# Patient Record
Sex: MALE | Race: WHITE | NOT HISPANIC OR LATINO | Employment: UNEMPLOYED | URBAN - METROPOLITAN AREA
[De-identification: names, ages, dates, MRNs, and addresses within clinical notes are randomized per-mention and may not be internally consistent; named-entity substitution may affect disease eponyms.]

---

## 2022-10-11 ENCOUNTER — HOSPITAL ENCOUNTER (INPATIENT)
Facility: HOSPITAL | Age: 62
LOS: 9 days | Discharge: NURSING, REHAB OR LONG-TERM CARE | End: 2022-10-20
Attending: EMERGENCY MEDICINE | Admitting: INTERNAL MEDICINE

## 2022-10-11 ENCOUNTER — APPOINTMENT (OUTPATIENT)
Dept: GENERAL RADIOLOGY | Facility: HOSPITAL | Age: 62
End: 2022-10-11

## 2022-10-11 ENCOUNTER — APPOINTMENT (OUTPATIENT)
Dept: CT IMAGING | Facility: HOSPITAL | Age: 62
End: 2022-10-11

## 2022-10-11 DIAGNOSIS — A41.9 SEPSIS, DUE TO UNSPECIFIED ORGANISM, UNSPECIFIED WHETHER ACUTE ORGAN DYSFUNCTION PRESENT: Primary | ICD-10-CM

## 2022-10-11 DIAGNOSIS — Z89.611 S/P AKA (ABOVE KNEE AMPUTATION), RIGHT: ICD-10-CM

## 2022-10-11 DIAGNOSIS — Z78.9 DECREASED ACTIVITIES OF DAILY LIVING (ADL): ICD-10-CM

## 2022-10-11 DIAGNOSIS — I70.261 ATHEROSCLEROSIS OF NATIVE ARTERY OF RIGHT LOWER EXTREMITY WITH GANGRENE: ICD-10-CM

## 2022-10-11 DIAGNOSIS — R26.2 DIFFICULTY WALKING: ICD-10-CM

## 2022-10-11 LAB
ALBUMIN SERPL-MCNC: 3.2 G/DL (ref 3.5–5.2)
ALBUMIN/GLOB SERPL: 0.6 G/DL
ALP SERPL-CCNC: 348 U/L (ref 39–117)
ALT SERPL W P-5'-P-CCNC: 67 U/L (ref 1–41)
ANION GAP SERPL CALCULATED.3IONS-SCNC: 10.9 MMOL/L (ref 5–15)
ANION GAP SERPL CALCULATED.3IONS-SCNC: 14.4 MMOL/L (ref 5–15)
AST SERPL-CCNC: 42 U/L (ref 1–40)
BASOPHILS # BLD AUTO: 0.05 10*3/MM3 (ref 0–0.2)
BASOPHILS NFR BLD AUTO: 0.2 % (ref 0–1.5)
BILIRUB SERPL-MCNC: 0.4 MG/DL (ref 0–1.2)
BUN SERPL-MCNC: 15 MG/DL (ref 8–23)
BUN SERPL-MCNC: 18 MG/DL (ref 8–23)
BUN/CREAT SERPL: 30.6 (ref 7–25)
BUN/CREAT SERPL: 32.7 (ref 7–25)
CALCIUM SPEC-SCNC: 10.3 MG/DL (ref 8.6–10.5)
CALCIUM SPEC-SCNC: 9.1 MG/DL (ref 8.6–10.5)
CHLORIDE SERPL-SCNC: 83 MMOL/L (ref 98–107)
CHLORIDE SERPL-SCNC: 87 MMOL/L (ref 98–107)
CK SERPL-CCNC: 185 U/L (ref 20–200)
CO2 SERPL-SCNC: 21.6 MMOL/L (ref 22–29)
CO2 SERPL-SCNC: 23.1 MMOL/L (ref 22–29)
CREAT SERPL-MCNC: 0.49 MG/DL (ref 0.76–1.27)
CREAT SERPL-MCNC: 0.55 MG/DL (ref 0.76–1.27)
D-LACTATE SERPL-SCNC: 1.9 MMOL/L (ref 0.5–2)
DEPRECATED RDW RBC AUTO: 50.7 FL (ref 37–54)
EGFRCR SERPLBLD CKD-EPI 2021: 111.4 ML/MIN/1.73
EGFRCR SERPLBLD CKD-EPI 2021: 115.3 ML/MIN/1.73
EOSINOPHIL # BLD AUTO: 0.03 10*3/MM3 (ref 0–0.4)
EOSINOPHIL NFR BLD AUTO: 0.1 % (ref 0.3–6.2)
ERYTHROCYTE [DISTWIDTH] IN BLOOD BY AUTOMATED COUNT: 15.9 % (ref 12.3–15.4)
GLOBULIN UR ELPH-MCNC: 5.2 GM/DL
GLUCOSE SERPL-MCNC: 104 MG/DL (ref 65–99)
GLUCOSE SERPL-MCNC: 127 MG/DL (ref 65–99)
HCT VFR BLD AUTO: 32.5 % (ref 37.5–51)
HGB BLD-MCNC: 11.1 G/DL (ref 13–17.7)
HOLD SPECIMEN: NORMAL
HOLD SPECIMEN: NORMAL
IMM GRANULOCYTES # BLD AUTO: 0.38 10*3/MM3 (ref 0–0.05)
IMM GRANULOCYTES NFR BLD AUTO: 1.9 % (ref 0–0.5)
INR PPP: 1.07 (ref 0.86–1.15)
LARGE PLATELETS: NORMAL
LYMPHOCYTES # BLD AUTO: 1.86 10*3/MM3 (ref 0.7–3.1)
LYMPHOCYTES NFR BLD AUTO: 9.2 % (ref 19.6–45.3)
MAGNESIUM SERPL-MCNC: 1.9 MG/DL (ref 1.6–2.4)
MCH RBC QN AUTO: 30.3 PG (ref 26.6–33)
MCHC RBC AUTO-ENTMCNC: 34.2 G/DL (ref 31.5–35.7)
MCV RBC AUTO: 88.8 FL (ref 79–97)
MONOCYTES # BLD AUTO: 1.95 10*3/MM3 (ref 0.1–0.9)
MONOCYTES NFR BLD AUTO: 9.6 % (ref 5–12)
NEUTROPHILS NFR BLD AUTO: 16.05 10*3/MM3 (ref 1.7–7)
NEUTROPHILS NFR BLD AUTO: 79 % (ref 42.7–76)
NRBC BLD AUTO-RTO: 0 /100 WBC (ref 0–0.2)
NT-PROBNP SERPL-MCNC: 115.3 PG/ML (ref 0–900)
PHOSPHATE SERPL-MCNC: 3 MG/DL (ref 2.5–4.5)
PLATELET # BLD AUTO: 792 10*3/MM3 (ref 140–450)
PMV BLD AUTO: 8 FL (ref 6–12)
POTASSIUM SERPL-SCNC: 4.2 MMOL/L (ref 3.5–5.2)
POTASSIUM SERPL-SCNC: 5.3 MMOL/L (ref 3.5–5.2)
PROT SERPL-MCNC: 8.4 G/DL (ref 6–8.5)
PROTHROMBIN TIME: 14 SECONDS (ref 11.8–14.9)
RBC # BLD AUTO: 3.66 10*6/MM3 (ref 4.14–5.8)
RBC MORPH BLD: NORMAL
SMALL PLATELETS BLD QL SMEAR: NORMAL
SODIUM SERPL-SCNC: 119 MMOL/L (ref 136–145)
SODIUM SERPL-SCNC: 121 MMOL/L (ref 136–145)
TROPONIN T SERPL-MCNC: <0.01 NG/ML (ref 0–0.03)
WBC MORPH BLD: NORMAL
WBC NRBC COR # BLD: 20.32 10*3/MM3 (ref 3.4–10.8)
WHOLE BLOOD HOLD COAG: NORMAL
WHOLE BLOOD HOLD SPECIMEN: NORMAL

## 2022-10-11 PROCEDURE — 83880 ASSAY OF NATRIURETIC PEPTIDE: CPT | Performed by: EMERGENCY MEDICINE

## 2022-10-11 PROCEDURE — 75635 CT ANGIO ABDOMINAL ARTERIES: CPT

## 2022-10-11 PROCEDURE — 93010 ELECTROCARDIOGRAM REPORT: CPT | Performed by: SPECIALIST

## 2022-10-11 PROCEDURE — 25010000002 VANCOMYCIN 5 G RECONSTITUTED SOLUTION: Performed by: EMERGENCY MEDICINE

## 2022-10-11 PROCEDURE — G0432 EIA HIV-1/HIV-2 SCREEN: HCPCS | Performed by: FAMILY MEDICINE

## 2022-10-11 PROCEDURE — 87040 BLOOD CULTURE FOR BACTERIA: CPT | Performed by: EMERGENCY MEDICINE

## 2022-10-11 PROCEDURE — 80048 BASIC METABOLIC PNL TOTAL CA: CPT | Performed by: FAMILY MEDICINE

## 2022-10-11 PROCEDURE — 93005 ELECTROCARDIOGRAM TRACING: CPT | Performed by: EMERGENCY MEDICINE

## 2022-10-11 PROCEDURE — 87070 CULTURE OTHR SPECIMN AEROBIC: CPT | Performed by: EMERGENCY MEDICINE

## 2022-10-11 PROCEDURE — 99223 1ST HOSP IP/OBS HIGH 75: CPT | Performed by: FAMILY MEDICINE

## 2022-10-11 PROCEDURE — 83605 ASSAY OF LACTIC ACID: CPT | Performed by: EMERGENCY MEDICINE

## 2022-10-11 PROCEDURE — 25010000002 PIPERACILLIN SOD-TAZOBACTAM PER 1 G: Performed by: EMERGENCY MEDICINE

## 2022-10-11 PROCEDURE — 85007 BL SMEAR W/DIFF WBC COUNT: CPT | Performed by: EMERGENCY MEDICINE

## 2022-10-11 PROCEDURE — 99285 EMERGENCY DEPT VISIT HI MDM: CPT

## 2022-10-11 PROCEDURE — 84484 ASSAY OF TROPONIN QUANT: CPT | Performed by: EMERGENCY MEDICINE

## 2022-10-11 PROCEDURE — 85610 PROTHROMBIN TIME: CPT | Performed by: FAMILY MEDICINE

## 2022-10-11 PROCEDURE — 94799 UNLISTED PULMONARY SVC/PX: CPT

## 2022-10-11 PROCEDURE — 84100 ASSAY OF PHOSPHORUS: CPT | Performed by: FAMILY MEDICINE

## 2022-10-11 PROCEDURE — 80053 COMPREHEN METABOLIC PANEL: CPT | Performed by: EMERGENCY MEDICINE

## 2022-10-11 PROCEDURE — 0 IOPAMIDOL PER 1 ML: Performed by: EMERGENCY MEDICINE

## 2022-10-11 PROCEDURE — 25010000002 MORPHINE PER 10 MG: Performed by: FAMILY MEDICINE

## 2022-10-11 PROCEDURE — 83735 ASSAY OF MAGNESIUM: CPT | Performed by: EMERGENCY MEDICINE

## 2022-10-11 PROCEDURE — 71045 X-RAY EXAM CHEST 1 VIEW: CPT

## 2022-10-11 PROCEDURE — 83036 HEMOGLOBIN GLYCOSYLATED A1C: CPT | Performed by: FAMILY MEDICINE

## 2022-10-11 PROCEDURE — 87205 SMEAR GRAM STAIN: CPT | Performed by: EMERGENCY MEDICINE

## 2022-10-11 PROCEDURE — 87641 MR-STAPH DNA AMP PROBE: CPT | Performed by: FAMILY MEDICINE

## 2022-10-11 PROCEDURE — 82550 ASSAY OF CK (CPK): CPT | Performed by: FAMILY MEDICINE

## 2022-10-11 PROCEDURE — 85025 COMPLETE CBC W/AUTO DIFF WBC: CPT | Performed by: EMERGENCY MEDICINE

## 2022-10-11 RX ORDER — AMOXICILLIN 250 MG
2 CAPSULE ORAL 2 TIMES DAILY
Status: DISCONTINUED | OUTPATIENT
Start: 2022-10-11 | End: 2022-10-20 | Stop reason: HOSPADM

## 2022-10-11 RX ORDER — CHOLECALCIFEROL (VITAMIN D3) 125 MCG
5 CAPSULE ORAL NIGHTLY PRN
Status: DISCONTINUED | OUTPATIENT
Start: 2022-10-11 | End: 2022-10-20 | Stop reason: HOSPADM

## 2022-10-11 RX ORDER — SODIUM CHLORIDE 0.9 % (FLUSH) 0.9 %
10 SYRINGE (ML) INJECTION AS NEEDED
Status: DISCONTINUED | OUTPATIENT
Start: 2022-10-11 | End: 2022-10-20 | Stop reason: HOSPADM

## 2022-10-11 RX ORDER — POLYETHYLENE GLYCOL 3350 17 G/17G
17 POWDER, FOR SOLUTION ORAL DAILY PRN
Status: DISCONTINUED | OUTPATIENT
Start: 2022-10-11 | End: 2022-10-20 | Stop reason: HOSPADM

## 2022-10-11 RX ORDER — ACETAMINOPHEN 650 MG/1
650 SUPPOSITORY RECTAL EVERY 4 HOURS PRN
Status: DISCONTINUED | OUTPATIENT
Start: 2022-10-11 | End: 2022-10-12

## 2022-10-11 RX ORDER — BISACODYL 10 MG
10 SUPPOSITORY, RECTAL RECTAL DAILY PRN
Status: DISCONTINUED | OUTPATIENT
Start: 2022-10-11 | End: 2022-10-20 | Stop reason: HOSPADM

## 2022-10-11 RX ORDER — ONDANSETRON 2 MG/ML
4 INJECTION INTRAMUSCULAR; INTRAVENOUS EVERY 6 HOURS PRN
Status: DISCONTINUED | OUTPATIENT
Start: 2022-10-11 | End: 2022-10-20 | Stop reason: HOSPADM

## 2022-10-11 RX ORDER — BISACODYL 5 MG/1
5 TABLET, DELAYED RELEASE ORAL DAILY PRN
Status: DISCONTINUED | OUTPATIENT
Start: 2022-10-11 | End: 2022-10-20 | Stop reason: HOSPADM

## 2022-10-11 RX ORDER — ACETAMINOPHEN 160 MG/5ML
650 SOLUTION ORAL EVERY 4 HOURS PRN
Status: DISCONTINUED | OUTPATIENT
Start: 2022-10-11 | End: 2022-10-12

## 2022-10-11 RX ORDER — SODIUM CHLORIDE 0.9 % (FLUSH) 0.9 %
10 SYRINGE (ML) INJECTION EVERY 12 HOURS SCHEDULED
Status: DISCONTINUED | OUTPATIENT
Start: 2022-10-11 | End: 2022-10-20 | Stop reason: HOSPADM

## 2022-10-11 RX ORDER — MORPHINE SULFATE 2 MG/ML
1 INJECTION, SOLUTION INTRAMUSCULAR; INTRAVENOUS EVERY 4 HOURS PRN
Status: DISCONTINUED | OUTPATIENT
Start: 2022-10-11 | End: 2022-10-14

## 2022-10-11 RX ORDER — SODIUM CHLORIDE 9 MG/ML
40 INJECTION, SOLUTION INTRAVENOUS AS NEEDED
Status: DISCONTINUED | OUTPATIENT
Start: 2022-10-11 | End: 2022-10-20 | Stop reason: HOSPADM

## 2022-10-11 RX ORDER — NALOXONE HCL 0.4 MG/ML
0.4 VIAL (ML) INJECTION
Status: DISCONTINUED | OUTPATIENT
Start: 2022-10-11 | End: 2022-10-14

## 2022-10-11 RX ORDER — ACETAMINOPHEN 325 MG/1
650 TABLET ORAL EVERY 4 HOURS PRN
Status: DISCONTINUED | OUTPATIENT
Start: 2022-10-11 | End: 2022-10-20 | Stop reason: HOSPADM

## 2022-10-11 RX ORDER — SODIUM CHLORIDE, SODIUM LACTATE, POTASSIUM CHLORIDE, CALCIUM CHLORIDE 600; 310; 30; 20 MG/100ML; MG/100ML; MG/100ML; MG/100ML
100 INJECTION, SOLUTION INTRAVENOUS CONTINUOUS
Status: DISCONTINUED | OUTPATIENT
Start: 2022-10-11 | End: 2022-10-13

## 2022-10-11 RX ADMIN — IOPAMIDOL 100 ML: 755 INJECTION, SOLUTION INTRAVENOUS at 18:56

## 2022-10-11 RX ADMIN — TAZOBACTAM SODIUM AND PIPERACILLIN SODIUM 3.38 G: 375; 3 INJECTION, SOLUTION INTRAVENOUS at 17:51

## 2022-10-11 RX ADMIN — Medication 1000 MG: at 18:23

## 2022-10-11 RX ADMIN — ACETAMINOPHEN 650 MG: 325 TABLET ORAL at 23:26

## 2022-10-11 RX ADMIN — Medication 10 ML: at 23:17

## 2022-10-11 RX ADMIN — SODIUM CHLORIDE, POTASSIUM CHLORIDE, SODIUM LACTATE AND CALCIUM CHLORIDE 100 ML/HR: 600; 310; 30; 20 INJECTION, SOLUTION INTRAVENOUS at 22:05

## 2022-10-11 RX ADMIN — SODIUM CHLORIDE 1000 ML: 9 INJECTION, SOLUTION INTRAVENOUS at 17:48

## 2022-10-11 RX ADMIN — MORPHINE SULFATE 1 MG: 2 INJECTION, SOLUTION INTRAMUSCULAR; INTRAVENOUS at 22:01

## 2022-10-11 NOTE — H&P
[facility]  Date: 10/11/2022   Patient Name: Uche Pereyra  : 1960  MRN: [id number]  Primary Care Physician:  Provider, No Known  Date of admission: 10/11/2022    Subjective   Subjective     Chief Complaint: leg pain    HPI:    Uche Pereyra is a 62 y.o. male who presents to the hospital with right leg pain, redness and swelling.  This has reportedly been going on since August.  He states he thought he injured the leg but really cannot give any specifics on that.  He presented to the emergency department because the leg was not getting better and was causing him significant pain.  Pain is severe, located in the right lower extremity, sharp in nature, constant and unrelieved with initial doses of morphine.  In the emergency department he was noted to have extensive gangrene of the right lower extremity with maggots.  He was also noted to be septic and was started on initial treatment for sepsis with IV antibiotics and IV fluids.  Vascular surgery was consulted and requested a CT with runoff for surgical planning purposes.  We are admitting him to the ICU for further inpatient management      Personal History     Past Medical History:  History reviewed. Denies past medical history.      Past Surgical History:  History reviewed. Denies surgical history.      Family History:   IUP      Social History:   Social History     Tobacco Use   • Smoking status: Every Day     Packs/day: 1.00     Years: 50.00     Pack years: 50.00     Types: Cigarettes     Passive exposure: Current   • Smokeless tobacco: Never   Vaping Use   • Vaping Use: Never used   Substance Use Topics   • Drug use: Not Currently     Types: Marijuana         Home Medications:       Allergies:  No Known Allergies    Review of Systems  He admits more rapid weight loss recently because he has not felt like eating   All systems were reviewed and negative except for: noted above or in HPI    Objective    Objective     Vitals:   Temp:  [98.6 °F (37 °C)] 98.6 °F (37 °C)  Heart Rate:  [125-141] 125  Resp:  [18] 18  BP: (115-123)/(85-90) 123/90    Physical Exam    Constitutional: Awake, alert, no acute distress, cachectic   Eyes: Pupils equal, sclerae anicteric, no conjunctival injection   HENT: NCAT, mucous membranes moist   Neck: Supple, no thyromegaly, no lymphadenopathy, trachea midline   Respiratory: Clear to auscultation bilaterally, nonlabored respirations    Cardiovascular: RRR, no murmurs, rubs, or gallops, palpable pedal pulses bilaterally   Gastrointestinal: Positive bowel sounds, soft, nontender, nondistended   Musculoskeletal:Extensive foul-smelling gangrene of the right lower extremity with maggots   Psychiatric: Appropriate affect, cooperative   Neurologic: Oriented x 3, strength symmetric in all extremities, Cranial Nerves grossly intact to confrontation, speech clear   Skin: Extensive foul-smelling gangrene of the right lower extremity with maggots           Assessment & Plan   Assessment / Plan     Assessment/Plan:   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 after midnight  Check HIV and hepatitis C status  Obtain ultrasound of the right upper quadrant to evaluate abnormal of the liver enzymes  CBC in am for surveillance of any acute blood loss or thrombocytopenia  Metabolic panel in the morning to evaluate electrolytes and renal function  Reviewed today's labs: Leukocytosis  Discussed with ER physician  Risk of primary complaint: patient is at significant risk of morbidity if primary complaint is not treated especially  considering the patient's comorbidities.  DVT prophylaxis:  Mechanical DVT prophylaxis orders are present.    CODE STATUS:    Code Status (Patient has no pulse and is not breathing): CPR (Attempt to Resuscitate)  Medical Interventions (Patient has pulse or is breathing): Full Support      Admission Status:  I believe this patient meets inpatient status.    Electronically signed by Raul Cook DO, 10/11/22, 7:38 PM EDT.

## 2022-10-11 NOTE — ED PROVIDER NOTES
Time: 5:12 PM EDT  Chief Complaint:   Chief Complaint   Patient presents with   • Wound Infection       History of Present Illness:  Patient is a 62 y.o. year old male who presents to the emergency department with leg infection to R lower extremity. Pt is accompanied by sister. According to sister, she first noticed changes to Pts RLE a couple of weeks ago, states it appeared 'puffy' but thought he had sustained an injury or trauma. Sister is unsure how long has it been since onset of symptoms. Pt doesn't have a PCP assigned yet.      Wound Infection  Location:  RLE  Severity:  Severe  Onset quality:  Unable to specify  Duration:  2 weeks  Timing:  Constant  Progression:  Worsening  Chronicity:  New  Relieved by:  None tried  Worsened by:  Nothing  Ineffective treatments:  None tried  Associated symptoms: no abdominal pain, no chest pain, no congestion, no cough, no diarrhea, no fever, no headaches, no myalgias, no nausea, no rhinorrhea, no shortness of breath, no sore throat and no vomiting            Patient Care Team  Primary Care Provider: Provider, No Known    Past Medical History:     No Known Allergies  History reviewed. No pertinent past medical history.  History reviewed. No pertinent surgical history.  History reviewed. No pertinent family history.    Home Medications:  Prior to Admission medications    Not on File        Social History:   Social History     Tobacco Use   • Smoking status: Every Day     Packs/day: 1.00     Years: 50.00     Pack years: 50.00     Types: Cigarettes     Passive exposure: Current   • Smokeless tobacco: Never   Vaping Use   • Vaping Use: Never used   Substance Use Topics   • Drug use: Not Currently     Types: Marijuana         Review of Systems:  Review of Systems   Constitutional: Negative for chills and fever.   HENT: Negative for congestion, rhinorrhea and sore throat.    Eyes: Negative for pain and visual disturbance.   Respiratory: Negative for apnea, cough, chest tightness  "and shortness of breath.    Cardiovascular: Negative for chest pain and palpitations.   Gastrointestinal: Negative for abdominal pain, diarrhea, nausea and vomiting.   Genitourinary: Negative for difficulty urinating and dysuria.   Musculoskeletal: Negative for joint swelling and myalgias.   Skin: Positive for wound (RLE wound infection). Negative for color change.   Neurological: Negative for seizures and headaches.   Psychiatric/Behavioral: Negative.    All other systems reviewed and are negative.       Physical Exam:  /75 (BP Location: Right arm, Patient Position: Lying)   Pulse (!) 126   Temp 98.6 °F (37 °C) (Oral)   Resp 18   Ht 177.8 cm (70\")   Wt 46.4 kg (102 lb 4.7 oz)   SpO2 100%   BMI 14.68 kg/m²     Physical Exam  Vitals and nursing note reviewed.   Constitutional:       General: He is not in acute distress.     Appearance: Normal appearance. He is not toxic-appearing.   HENT:      Head: Normocephalic and atraumatic.      Jaw: There is normal jaw occlusion.   Eyes:      General: Lids are normal.      Extraocular Movements: Extraocular movements intact.      Conjunctiva/sclera: Conjunctivae normal.      Pupils: Pupils are equal, round, and reactive to light.   Cardiovascular:      Rate and Rhythm: Normal rate and regular rhythm.      Pulses:           Dorsalis pedis pulses are 0 on the right side.      Heart sounds: Normal heart sounds.   Pulmonary:      Effort: Pulmonary effort is normal. No respiratory distress.      Breath sounds: Normal breath sounds. No wheezing or rhonchi.   Abdominal:      General: Abdomen is flat.      Palpations: Abdomen is soft.      Tenderness: There is no abdominal tenderness. There is no guarding or rebound.   Musculoskeletal:         General: Normal range of motion.      Cervical back: Normal range of motion and neck supple.      Right lower leg: No edema.      Left lower leg: No edema.      Right foot: Abnormal pulse (No DP pulse).      Comments: RLE: Gangrene " with foul smell and maggots.   Skin:     General: Skin is warm and dry.   Neurological:      Mental Status: He is alert and oriented to person, place, and time. Mental status is at baseline.   Psychiatric:         Mood and Affect: Mood normal.                Medications in the Emergency Department:  Medications   sodium chloride 0.9 % flush 10 mL (has no administration in time range)   sodium chloride 0.9 % flush 10 mL (has no administration in time range)   sodium chloride 0.9 % flush 10 mL (10 mL Intravenous Given 10/11/22 2317)   sodium chloride 0.9 % infusion 40 mL (has no administration in time range)   acetaminophen (TYLENOL) tablet 650 mg (650 mg Oral Given 10/11/22 2326)     Or   acetaminophen (TYLENOL) 160 MG/5ML solution 650 mg ( Oral Not Given:  See Alt 10/11/22 2326)     Or   acetaminophen (TYLENOL) suppository 650 mg ( Rectal Not Given:  See Alt 10/11/22 2326)   sennosides-docusate (PERICOLACE) 8.6-50 MG per tablet 2 tablet (2 tablets Oral Not Given 10/11/22 2224)     And   polyethylene glycol (MIRALAX) packet 17 g (has no administration in time range)     And   bisacodyl (DULCOLAX) EC tablet 5 mg (has no administration in time range)     And   bisacodyl (DULCOLAX) suppository 10 mg (has no administration in time range)   ondansetron (ZOFRAN) injection 4 mg (has no administration in time range)   lactated ringers bolus 1,392 mL (has no administration in time range)   lactated ringers infusion (100 mL/hr Intravenous New Bag 10/11/22 2205)   Pharmacy to dose vancomycin (has no administration in time range)   piperacillin-tazobactam (ZOSYN) 3.375 g in iso-osmotic dextrose 50 ml (premix) (has no administration in time range)   melatonin tablet 5 mg (has no administration in time range)   morphine injection 1 mg (1 mg Intravenous Given 10/11/22 2201)     And   naloxone (NARCAN) injection 0.4 mg (has no administration in time range)   vancomycin 1000 mg/250 mL 0.9% NS IVPB ([facility]) (has no administration in time  range)   sodium chloride 0.9 % bolus 1,000 mL (0 mL Intravenous Stopped 10/11/22 2018)   piperacillin-tazobactam (ZOSYN) 3.375 g in iso-osmotic dextrose 50 ml (premix) (0 g Intravenous Stopped 10/11/22 1821)   vancomycin 1000 mg/250 mL 0.9% NS IVPB ([facility]) (0 mg Intravenous Stopped 10/11/22 2018)   iopamidol (ISOVUE-370) 76 % injection 100 mL (100 mL Intravenous Given 10/11/22 1856)        Labs  Lab Results (last 24 hours)     Procedure Component Value Units Date/Time    Wound Culture - Wound, Leg, Right [[id number]] Collected: 10/11/22 1720    Specimen: Wound from Leg, Right Updated: 10/11/22 1749     Gram Stain Few (2+) Gram positive cocci in pairs and clusters      Few (2+) Gram negative bacilli    CBC & Differential [[id number]]  (Abnormal) Collected: 10/11/22 1744    Specimen: Blood Updated: 10/11/22 1818    Narrative:      The following orders were created for panel order CBC & Differential.  Procedure                               Abnormality         Status                     ---------                               -----------         ------                     CBC Auto Differential[[id number]]        Abnormal            Final result               Scan Slide[[id number]]                                       Final result                 Please view results for these tests on the individual orders.    Comprehensive Metabolic Panel [[id number]]  (Abnormal) Collected: 10/11/22 1744    Specimen: Blood Updated: 10/11/22 1813     Glucose 127 mg/dL      BUN 18 mg/dL      Creatinine 0.55 mg/dL      Sodium 119 mmol/L      Potassium 5.3 mmol/L      Chloride 83 mmol/L      CO2 21.6 mmol/L      Calcium 10.3 mg/dL      Total Protein 8.4 g/dL      Albumin 3.20 g/dL      ALT (SGPT) 67 U/L      AST (SGOT) 42 U/L      Alkaline Phosphatase 348 U/L      Total Bilirubin 0.4 mg/dL      Globulin 5.2 gm/dL      A/G Ratio 0.6 g/dL      BUN/Creatinine Ratio 32.7     Anion Gap 14.4 mmol/L      eGFR 111.4 mL/min/1.73      Comment: National  Kidney Foundation and American Society of Nephrology (ASN) Task Force recommended calculation based on the Chronic Kidney Disease Epidemiology Collaboration (CKD-EPI) equation refit without adjustment for race.       Narrative:      GFR Normal >60  Chronic Kidney Disease <60  Kidney Failure <15      Troponin [[id number]]  (Normal) Collected: 10/11/22 1744    Specimen: Blood Updated: 10/11/22 1808     Troponin T <0.010 ng/mL     Narrative:      Troponin T Reference Range:  <= 0.03 ng/mL-   Negative for AMI  >0.03 ng/mL-     Abnormal for myocardial necrosis.  Clinicians would have to utilize clinical acumen, EKG, Troponin and serial changes to determine if it is an Acute Myocardial Infarction or myocardial injury due to an underlying chronic condition.       Results may be falsely decreased if patient taking Biotin.      Magnesium [[id number]]  (Normal) Collected: 10/11/22 1744    Specimen: Blood Updated: 10/11/22 1810     Magnesium 1.9 mg/dL     BNP [[id number]]  (Normal) Collected: 10/11/22 1744    Specimen: Blood Updated: 10/11/22 1808     proBNP 115.3 pg/mL     Narrative:      Among patients with dyspnea, NT-proBNP is highly sensitive for the detection of acute congestive heart failure. In addition NT-proBNP of <300 pg/ml effectively rules out acute congestive heart failure with 99% negative predictive value.    Results may be falsely decreased if patient taking Biotin.      CBC Auto Differential [[id number]]  (Abnormal) Collected: 10/11/22 1744    Specimen: Blood Updated: 10/11/22 1818     WBC 20.32 10*3/mm3      RBC 3.66 10*6/mm3      Hemoglobin 11.1 g/dL      Hematocrit 32.5 %      MCV 88.8 fL      MCH 30.3 pg      MCHC 34.2 g/dL      RDW 15.9 %      RDW-SD 50.7 fl      MPV 8.0 fL      Platelets 792 10*3/mm3      Neutrophil % 79.0 %      Lymphocyte % 9.2 %      Monocyte % 9.6 %      Eosinophil % 0.1 %      Basophil % 0.2 %      Immature Grans % 1.9 %      Neutrophils, Absolute 16.05 10*3/mm3      Lymphocytes,  Absolute 1.86 10*3/mm3      Monocytes, Absolute 1.95 10*3/mm3      Eosinophils, Absolute 0.03 10*3/mm3      Basophils, Absolute 0.05 10*3/mm3      Immature Grans, Absolute 0.38 10*3/mm3      nRBC 0.0 /100 WBC     Lactic Acid, Plasma [[id number]]  (Normal) Collected: 10/11/22 1744    Specimen: Blood Updated: 10/11/22 1804     Lactate 1.9 mmol/L     Blood Culture - Blood, Arm, Left [[id number]] Collected: 10/11/22 1744    Specimen: Blood from Arm, Left Updated: 10/11/22 1745    Scan Slide [[id number]] Collected: 10/11/22 1744    Specimen: Blood Updated: 10/11/22 1818     RBC Morphology Normal     WBC Morphology Normal     Platelet Estimate Increased     Large Platelets Slight/1+    Hemoglobin A1c [[id number]] Collected: 10/11/22 1744    Specimen: Blood Updated: 10/11/22 2155    Protime-INR [[id number]]  (Normal) Collected: 10/11/22 1744    Specimen: Blood Updated: 10/11/22 2203     Protime 14.0 Seconds      INR 1.07    Narrative:      Suggested Therapeutic Ranges For Oral Anticoagulant Therapy:  Level of Therapy                      INR Target Range  Standard Dose                            2.0-3.0  High Dose                                2.5-3.5  Patients not receiving anticoagulant  Therapy Normal Range                     0.86-1.15    Blood Culture - Blood, Arm, Left [[id number]] Collected: 10/11/22 1745    Specimen: Blood from Arm, Left Updated: 10/11/22 1745    Basic Metabolic Panel [[id number]]  (Abnormal) Collected: 10/11/22 1943    Specimen: Blood Updated: 10/11/22 2017     Glucose 104 mg/dL      BUN 15 mg/dL      Creatinine 0.49 mg/dL      Sodium 121 mmol/L      Potassium 4.2 mmol/L      Chloride 87 mmol/L      CO2 23.1 mmol/L      Calcium 9.1 mg/dL      BUN/Creatinine Ratio 30.6     Anion Gap 10.9 mmol/L      eGFR 115.3 mL/min/1.73      Comment: National Kidney Foundation and American Society of Nephrology (ASN) Task Force recommended calculation based on the Chronic Kidney Disease Epidemiology Collaboration  (CKD-EPI) equation refit without adjustment for race.       Narrative:      GFR Normal >60  Chronic Kidney Disease <60  Kidney Failure <15      CK [[id number]]  (Normal) Collected: 10/11/22 1943    Specimen: Blood Updated: 10/11/22 2205     Creatine Kinase 185 U/L     Phosphorus [[id number]]  (Normal) Collected: 10/11/22 1943    Specimen: Blood Updated: 10/11/22 2213     Phosphorus 3.0 mg/dL            Imaging:  CT Angio Abdominal Aorta Bilateral Iliofem Runoff    Result Date: 10/11/2022  PROCEDURE: CT ANGIO ABDOMINAL AORTA BILAT ILIOFEM RUNOFF W WO CONTRAST  COMPARISON: None  INDICATIONS: evaluate arterial insufficiency  TECHNIQUE: After obtaining the patient's consent, CT images of the abdomen, pelvis, and lower extremities were obtained without and with non-ionic intravenous contrast material. Multi-planar reformatted/3-D images were created to optimize visualization of vascular anatomy.   PROTOCOL:   Standard imaging protocol performed    RADIATION:   DLP: 670.9mGy*cm   Automated exposure control was utilized to minimize radiation dose. CONTRAST: 100cc Isovue 370 I.V. LABS:   eGFR: >60ml/min/1.73m2  FINDINGS:  Vascular findings:  Distal descending thoracic aorta is of normal caliber.  No evidence of aortic dissection.  Upper abdominal aorta is of normal caliber.  Celiac and superior mesenteric arteries are patent without focal stenosis.  There is a solitary right renal artery which appears patent without stenosis.  There are 2 left renal arteries which appear patent without focal stenosis.  There is mild calcified plaque of the distal abdominal aorta.  Inferior mesenteric artery is patent.  There is mild calcified plaque of the bilateral common iliac arteries.  There is a large amount of calcified plaque at the distal right common iliac and proximal right internal iliac arteries.  The right external iliac artery is completely occluded at its origin.  The right internal iliac artery is diffusely diseased.  Right  common femoral, profunda femoral, and superficial femoral arteries are completely occluded.  There are multiple small collateral vessels throughout the thigh.  There is reconstitution of the popliteal artery which is very diminutive.  The anterior tibial artery is occluded just distal to its origin.  Posterior tibial artery is occluded at its origin.  Peroneal artery is occluded near the level of the mid calf.  There is a large amount of gas throughout the soft tissues of the right foot and lower leg including gas within the tarsal metatarsals and phalanges of the right foot.  There is also gas throughout the marrow spaces of the right tibia and fibula.  Gas is seen within the superficial subcutaneous fat as well as the deep compartments of the lower leg.  Findings would be consistent with cellulitis and gas-forming infection.  No gas seen within the soft tissues of the thigh.  No discrete fluid collection or abscess identified.  Left common iliac artery is patent without evidence of stenosis.  There is moderate calcified plaque of the left internal iliac artery which is patent without significant stenosis.  Left external iliac artery is occluded at its origin.  Left common femoral, profunda femoral, and superficial femoral arteries are occluded.  There are multiple small vessels throughout the thigh with reconstitution of the popliteal artery at the level of the knee.  Popliteal artery is very diminutive.  The anterior tibial artery is patent into the foot.  Peroneal artery is also patent into the foot.  Posterior tibial artery is occluded near the level of the ankle.  No gas seen within the soft tissues.  No abnormal soft tissue mass or fluid collection seen within the left lower extremity.  Nonvascular findings:  Visualized lung bases are clear.  The liver, pancreas, and spleen are within normal limits.  Kidneys appear normal bilaterally.  No evidence of hydronephrosis.  No abdominal or retroperitoneal  adenopathy.  Upper GI tract appears within normal limits.  Gallbladder appears within normal limits.  There are some fluid-filled loops of mildly distended bowel within the mid abdomen suggesting ileus.  No evidence of bowel obstruction.  Pelvis:  Urinary bladder is within normal limits.  GI tract appears within normal limits.  No pelvic or inguinal adenopathy.  No lytic or sclerotic bony lesion seen within the abdomen or pelvis.        1. Extensive gas within the right foot and right lower leg compatible with cellulitis and infection with gas-forming organism. 2. 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  There is reconstitution of small popliteal artery on the left with 2 vessel runoff into the left foot.  There is also reconstitution of the small right popliteal artery with the anterior and posterior tibial arteries and peroneal artery occluded above the level of the ankle.   Findings personally discussed with Dr. Song of the emergency department at approximately 1915 hours on 10/11/2022    COSTA ROBBINS MD       Electronically Signed and Approved By: COSTA ROBBINS MD on 10/11/2022 at 20:30             XR Chest 1 View    Result Date: 10/11/2022  PROCEDURE: XR CHEST 1 VW  COMPARISON: [facility], CT, CT ANGIO ABDOMINAL AORTA BILAT ILIOFEM RUNOFF, 10/11/2022, 18:42.  INDICATIONS: infection  FINDINGS:  Heart size and pulmonary vessels are within normal limits.  There is some minimal linear opacity in the bilateral upper lobes which may be secondary to atelectasis or scarring.  No pleural effusion or pneumothorax.  Bony structures are unremarkable.        1. Linear opacities within the bilateral upper lobes which may be due to atelectasis or scarring.  No other acute cardiopulmonary disease identified.       COSTA ROBBINS MD       Electronically Signed and Approved By: COSTA ROBBINS MD on 10/11/2022 at 19:27                Procedures:  Procedures    Progress                            The patient was initially evaluated in the triage area where orders were placed. The patient was later dispositioned by Margo Lui PA-C.      The patient was advised to stay for completion of workup which includes but is not limited to communication of labs and radiological results, reassessment and plan. The patient was advised that leaving prior to disposition by a provider could result in critical findings that are not communicated to the patient.     Medical Decision Making:  MDM  Number of Diagnoses or Management Options  Sepsis, due to unspecified organism, unspecified whether acute organ dysfunction present (HCC)  Diagnosis management comments: Sepsis was recognized at 1730.      Patient has a sodium of 119 and a potassium of 5.3.  White blood cell count is 20,000.  I did discuss the case with Dr. Whittington who agrees to consult.  The patient was given Vanco and Zosyn in the ED.  CT angio was performed which shows complete occlusion of bilateral external iliacs common femoral and superficial femoral arteries.  Case was discussed with Dr. Cook who agrees with admission.    Total Critical Care time of 40 minutes. Total critical care time documented does not include time spent on separately billed procedures for services of nurses or physician assistants. I personally saw and examined the patient. I have reviewed all diagnostic interpretations and treatment plans as written. I was present for the key portions of any procedures performed and the inclusive time noted in any critical care statement. Critical care time includes patient management by me, time spent at the patients bedside,  time to review lab and imaging results, discussing patient care, documentation in the medical record, and time spent with family or caregiver.       Amount and/or Complexity of Data Reviewed  Clinical lab tests: reviewed  Tests in the radiology section of  CPT®: reviewed  Tests in the medicine section of CPT®: reviewed  Discussion of test results with the performing providers: yes  Discuss the patient with other providers: yes  Independent visualization of images, tracings, or specimens: yes    Risk of Complications, Morbidity, and/or Mortality  Presenting problems: moderate  Management options: moderate    Critical Care  Total time providing critical care: 30-74 minutes    Patient Progress  Patient progress: stable           The following orders were placed after triage and evaluation:  Orders Placed This Encounter   Procedures   • Wound Culture - Wound, Leg, Right   • Blood Culture - Blood,   • Blood Culture - Blood,   • MRSA Screen, PCR (Inpatient) - Swab, Nares   • XR Chest 1 View   • CT Angio Abdominal Aorta Bilateral Iliofem Runoff   • US Liver   • [place] Draw   • Comprehensive Metabolic Panel   • Troponin   • Magnesium   • BNP   • CBC Auto Differential   • Lactic Acid, Plasma   • Scan Slide   • Basic Metabolic Panel   • CBC Auto Differential   • Hemoglobin A1c   • CK   • Phosphorus   • Protime-INR   • Urine Drug Screen - Urine, Clean Catch   • Lipid Panel   • Urinalysis With Culture If Indicated -   • Basic Metabolic Panel   • Vancomycin, Trough   • NPO Diet NPO Type: Strict NPO   • Undress & Gown   • Continuous Pulse Oximetry   • Vital Signs   • Orthostatic Blood Pressure   • Vital Signs   • Notify Provider (With Default Parameters)   • Weigh Patient   • Oral Care   • Saline Lock & Maintain IV Access   • Place Sequential Compression Device   • Maintain Sequential Compression Device   • Cardiac Monitoring   • Vital Signs Per Hospital Policy   • Strict Intake & Output   • Opioid Administration - Document EtCO2 Value With Each Set of Vitals & Any Change in Patient Status   • Opioid Administration - Notify Provider Capnography (EtCO2)   • Opioid Administration - Document EtCO2 Value With Each Set of Vitals & Any Change in Patient Status   • Opioid Administration  - Notify Provider Capnography (EtCO2)   • Opioid Administration - Document EtCO2 Value With Each Set of Vitals & Any Change in Patient Status   • Opioid Administration - Notify Provider Capnography (EtCO2)   • Code Status and Medical Interventions:   • Inpatient Hospitalist Consult   • Inpatient Case Management  Consult   • Inpatient Nutrition Consult   • Oxygen Therapy- Nasal Cannula; 2 LPM; Titrate for SPO2: 92%, Greater Than or Equal To   • Opioid Administration - Capnography (EtCO2) Monitoring   • Opioid Administration - Capnography (EtCO2) Monitoring   • Opioid Administration - Capnography (EtCO2) Monitoring   • POC Glucose Once   • ECG 12 Lead   • Insert Peripheral IV   • Insert Peripheral IV   • Insert 2 Large Bore (18G or Larger) Peripheral IVs   • Inpatient Admission   • Fall Precautions   • Fall Precautions   • CBC & Differential   • Green Top (Gel)   • Lavender Top   • Gold Top - SST   • Light Blue Top       Final diagnoses:   Sepsis, due to unspecified organism, unspecified whether acute organ dysfunction present (HCC)          Disposition:  ED Disposition     ED Disposition   Decision to Admit    Condition   --    Comment   Level of Care: Critical Care [6]   Diagnosis: Sepsis (HCC) [[id number]]   Admitting Physician: IDA ALFARO [[id number]]   Attending Physician: IDA ALFARO [[id number]]   Certification: I Certify That Inpatient Hospital Services Are Medically Necessary For Greater Than 2 Midnights               This medical record created using voice recognition software.    Documentation assistance provided by Arlyn Hawley acting as scribe for Millicent Romano MD. Information recorded by the scribe was done at my direction and has been verified and validated by me.          Arlyn Hawley  10/11/22 7504       Millicent Romano MD  10/11/22 [id number]

## 2022-10-12 ENCOUNTER — APPOINTMENT (OUTPATIENT)
Dept: ULTRASOUND IMAGING | Facility: HOSPITAL | Age: 62
End: 2022-10-12

## 2022-10-12 PROBLEM — E43 SEVERE MALNUTRITION: Status: ACTIVE | Noted: 2022-10-12

## 2022-10-12 PROBLEM — A48.0 GAS GANGRENE: Status: ACTIVE | Noted: 2022-10-12

## 2022-10-12 PROBLEM — I70.261 ATHEROSCLEROSIS OF NATIVE ARTERY OF RIGHT LOWER EXTREMITY WITH GANGRENE: Status: ACTIVE | Noted: 2022-10-11

## 2022-10-12 LAB
ALBUMIN SERPL-MCNC: 2.4 G/DL (ref 3.5–5.2)
AMPHET+METHAMPHET UR QL: NEGATIVE
ANION GAP SERPL CALCULATED.3IONS-SCNC: 12.5 MMOL/L (ref 5–15)
ANION GAP SERPL CALCULATED.3IONS-SCNC: 8.9 MMOL/L (ref 5–15)
ANISOCYTOSIS BLD QL: NORMAL
BARBITURATES UR QL SCN: NEGATIVE
BASOPHILS # BLD AUTO: 0.05 10*3/MM3 (ref 0–0.2)
BASOPHILS NFR BLD AUTO: 0.2 % (ref 0–1.5)
BENZODIAZ UR QL SCN: NEGATIVE
BUN SERPL-MCNC: 10 MG/DL (ref 8–23)
BUN SERPL-MCNC: 13 MG/DL (ref 8–23)
BUN/CREAT SERPL: 25.6 (ref 7–25)
BUN/CREAT SERPL: 31.7 (ref 7–25)
CALCIUM SPEC-SCNC: 8.1 MG/DL (ref 8.6–10.5)
CALCIUM SPEC-SCNC: 9.4 MG/DL (ref 8.6–10.5)
CANNABINOIDS SERPL QL: NEGATIVE
CHLORIDE SERPL-SCNC: 89 MMOL/L (ref 98–107)
CHLORIDE SERPL-SCNC: 92 MMOL/L (ref 98–107)
CHOLEST SERPL-MCNC: 95 MG/DL (ref 0–200)
CO2 SERPL-SCNC: 21.5 MMOL/L (ref 22–29)
CO2 SERPL-SCNC: 22.1 MMOL/L (ref 22–29)
COCAINE UR QL: NEGATIVE
CREAT SERPL-MCNC: 0.39 MG/DL (ref 0.76–1.27)
CREAT SERPL-MCNC: 0.41 MG/DL (ref 0.76–1.27)
DEPRECATED RDW RBC AUTO: 51.5 FL (ref 37–54)
EGFRCR SERPLBLD CKD-EPI 2021: 121.7 ML/MIN/1.73
EGFRCR SERPLBLD CKD-EPI 2021: 123.5 ML/MIN/1.73
EOSINOPHIL # BLD AUTO: 0.02 10*3/MM3 (ref 0–0.4)
EOSINOPHIL NFR BLD AUTO: 0.1 % (ref 0.3–6.2)
ERYTHROCYTE [DISTWIDTH] IN BLOOD BY AUTOMATED COUNT: 15.8 % (ref 12.3–15.4)
GLUCOSE SERPL-MCNC: 103 MG/DL (ref 65–99)
GLUCOSE SERPL-MCNC: 118 MG/DL (ref 65–99)
HBA1C MFR BLD: 7.2 % (ref 4.8–5.6)
HCT VFR BLD AUTO: 26.4 % (ref 37.5–51)
HCV AB SER DONR QL: NORMAL
HDLC SERPL-MCNC: 26 MG/DL (ref 40–60)
HGB BLD-MCNC: 9.3 G/DL (ref 13–17.7)
HIV1+2 AB SER QL: NORMAL
IMM GRANULOCYTES # BLD AUTO: 0.4 10*3/MM3 (ref 0–0.05)
IMM GRANULOCYTES NFR BLD AUTO: 1.9 % (ref 0–0.5)
LDLC SERPL CALC-MCNC: 52 MG/DL (ref 0–100)
LDLC/HDLC SERPL: 2.01 {RATIO}
LYMPHOCYTES # BLD AUTO: 1.67 10*3/MM3 (ref 0.7–3.1)
LYMPHOCYTES NFR BLD AUTO: 8.1 % (ref 19.6–45.3)
MAGNESIUM SERPL-MCNC: 1.5 MG/DL (ref 1.6–2.4)
MCH RBC QN AUTO: 31.3 PG (ref 26.6–33)
MCHC RBC AUTO-ENTMCNC: 35.2 G/DL (ref 31.5–35.7)
MCV RBC AUTO: 88.9 FL (ref 79–97)
METHADONE UR QL SCN: NEGATIVE
MONOCYTES # BLD AUTO: 2.05 10*3/MM3 (ref 0.1–0.9)
MONOCYTES NFR BLD AUTO: 10 % (ref 5–12)
MRSA DNA SPEC QL NAA+PROBE: NORMAL
NEUTROPHILS NFR BLD AUTO: 16.37 10*3/MM3 (ref 1.7–7)
NEUTROPHILS NFR BLD AUTO: 79.7 % (ref 42.7–76)
NRBC BLD AUTO-RTO: 0 /100 WBC (ref 0–0.2)
OPIATES UR QL: POSITIVE
OXYCODONE UR QL SCN: NEGATIVE
PHOSPHATE SERPL-MCNC: 2.7 MG/DL (ref 2.5–4.5)
PLATELET # BLD AUTO: 667 10*3/MM3 (ref 140–450)
PMV BLD AUTO: 8.1 FL (ref 6–12)
POLYCHROMASIA BLD QL SMEAR: NORMAL
POTASSIUM SERPL-SCNC: 3.4 MMOL/L (ref 3.5–5.2)
POTASSIUM SERPL-SCNC: 4.2 MMOL/L (ref 3.5–5.2)
QT INTERVAL: 295 MS
RBC # BLD AUTO: 2.97 10*6/MM3 (ref 4.14–5.8)
SMALL PLATELETS BLD QL SMEAR: NORMAL
SODIUM SERPL-SCNC: 123 MMOL/L (ref 136–145)
SODIUM SERPL-SCNC: 123 MMOL/L (ref 136–145)
TRIGL SERPL-MCNC: 84 MG/DL (ref 0–150)
VLDLC SERPL-MCNC: 17 MG/DL (ref 5–40)
WBC MORPH BLD: NORMAL
WBC NRBC COR # BLD: 20.56 10*3/MM3 (ref 3.4–10.8)

## 2022-10-12 PROCEDURE — 99233 SBSQ HOSP IP/OBS HIGH 50: CPT | Performed by: INTERNAL MEDICINE

## 2022-10-12 PROCEDURE — 83735 ASSAY OF MAGNESIUM: CPT | Performed by: PHYSICIAN ASSISTANT

## 2022-10-12 PROCEDURE — 25010000002 MORPHINE PER 10 MG: Performed by: FAMILY MEDICINE

## 2022-10-12 PROCEDURE — 25010000002 KETOROLAC TROMETHAMINE PER 15 MG: Performed by: PHYSICIAN ASSISTANT

## 2022-10-12 PROCEDURE — 99252 IP/OBS CONSLTJ NEW/EST SF 35: CPT | Performed by: SURGERY

## 2022-10-12 PROCEDURE — 80069 RENAL FUNCTION PANEL: CPT | Performed by: PHYSICIAN ASSISTANT

## 2022-10-12 PROCEDURE — 80307 DRUG TEST PRSMV CHEM ANLYZR: CPT | Performed by: FAMILY MEDICINE

## 2022-10-12 PROCEDURE — 25010000002 VANCOMYCIN 5 G RECONSTITUTED SOLUTION: Performed by: FAMILY MEDICINE

## 2022-10-12 PROCEDURE — 80048 BASIC METABOLIC PNL TOTAL CA: CPT | Performed by: FAMILY MEDICINE

## 2022-10-12 PROCEDURE — 25010000002 PIPERACILLIN SOD-TAZOBACTAM PER 1 G: Performed by: FAMILY MEDICINE

## 2022-10-12 PROCEDURE — 85007 BL SMEAR W/DIFF WBC COUNT: CPT | Performed by: FAMILY MEDICINE

## 2022-10-12 PROCEDURE — 80061 LIPID PANEL: CPT | Performed by: FAMILY MEDICINE

## 2022-10-12 PROCEDURE — 87522 HEPATITIS C REVRS TRNSCRPJ: CPT | Performed by: FAMILY MEDICINE

## 2022-10-12 PROCEDURE — 86803 HEPATITIS C AB TEST: CPT | Performed by: FAMILY MEDICINE

## 2022-10-12 PROCEDURE — 25010000002 MAGNESIUM SULFATE 2 GM/50ML SOLUTION: Performed by: PHYSICIAN ASSISTANT

## 2022-10-12 PROCEDURE — 85025 COMPLETE CBC W/AUTO DIFF WBC: CPT | Performed by: FAMILY MEDICINE

## 2022-10-12 PROCEDURE — 99291 CRITICAL CARE FIRST HOUR: CPT | Performed by: INTERNAL MEDICINE

## 2022-10-12 PROCEDURE — 76705 ECHO EXAM OF ABDOMEN: CPT

## 2022-10-12 RX ORDER — POTASSIUM CHLORIDE 750 MG/1
40 CAPSULE, EXTENDED RELEASE ORAL EVERY 4 HOURS
Status: COMPLETED | OUTPATIENT
Start: 2022-10-12 | End: 2022-10-12

## 2022-10-12 RX ORDER — HYDROCODONE BITARTRATE AND ACETAMINOPHEN 10; 325 MG/1; MG/1
1 TABLET ORAL EVERY 6 HOURS PRN
Status: DISCONTINUED | OUTPATIENT
Start: 2022-10-12 | End: 2022-10-20 | Stop reason: HOSPADM

## 2022-10-12 RX ORDER — MAGNESIUM SULFATE HEPTAHYDRATE 40 MG/ML
4 INJECTION, SOLUTION INTRAVENOUS ONCE
Status: COMPLETED | OUTPATIENT
Start: 2022-10-12 | End: 2022-10-12

## 2022-10-12 RX ORDER — MORPHINE SULFATE 2 MG/ML
2 INJECTION, SOLUTION INTRAMUSCULAR; INTRAVENOUS
Status: DISCONTINUED | OUTPATIENT
Start: 2022-10-12 | End: 2022-10-14

## 2022-10-12 RX ORDER — KETOROLAC TROMETHAMINE 15 MG/ML
15 INJECTION, SOLUTION INTRAMUSCULAR; INTRAVENOUS ONCE
Status: COMPLETED | OUTPATIENT
Start: 2022-10-12 | End: 2022-10-12

## 2022-10-12 RX ORDER — HYDROCODONE BITARTRATE AND ACETAMINOPHEN 5; 325 MG/1; MG/1
1 TABLET ORAL EVERY 6 HOURS PRN
Status: DISCONTINUED | OUTPATIENT
Start: 2022-10-12 | End: 2022-10-20 | Stop reason: HOSPADM

## 2022-10-12 RX ADMIN — MORPHINE SULFATE 2 MG: 2 INJECTION, SOLUTION INTRAMUSCULAR; INTRAVENOUS at 22:34

## 2022-10-12 RX ADMIN — VANCOMYCIN HYDROCHLORIDE 1000 MG: 5 INJECTION, POWDER, LYOPHILIZED, FOR SOLUTION INTRAVENOUS at 21:16

## 2022-10-12 RX ADMIN — KETOROLAC TROMETHAMINE 15 MG: 15 INJECTION, SOLUTION INTRAMUSCULAR; INTRAVENOUS at 09:44

## 2022-10-12 RX ADMIN — MORPHINE SULFATE 2 MG: 2 INJECTION, SOLUTION INTRAMUSCULAR; INTRAVENOUS at 01:33

## 2022-10-12 RX ADMIN — HYDROCODONE BITARTRATE AND ACETAMINOPHEN 1 TABLET: 10; 325 TABLET ORAL at 11:26

## 2022-10-12 RX ADMIN — POTASSIUM CHLORIDE 40 MEQ: 10 CAPSULE, COATED, EXTENDED RELEASE ORAL at 16:05

## 2022-10-12 RX ADMIN — HYDROCODONE BITARTRATE AND ACETAMINOPHEN 1 TABLET: 10; 325 TABLET ORAL at 21:03

## 2022-10-12 RX ADMIN — Medication 10 ML: at 21:04

## 2022-10-12 RX ADMIN — VANCOMYCIN HYDROCHLORIDE 1000 MG: 5 INJECTION, POWDER, LYOPHILIZED, FOR SOLUTION INTRAVENOUS at 10:52

## 2022-10-12 RX ADMIN — TAZOBACTAM SODIUM AND PIPERACILLIN SODIUM 3.38 G: 375; 3 INJECTION, SOLUTION INTRAVENOUS at 03:07

## 2022-10-12 RX ADMIN — SENNOSIDES AND DOCUSATE SODIUM 2 TABLET: 8.6; 5 TABLET ORAL at 21:17

## 2022-10-12 RX ADMIN — POTASSIUM CHLORIDE 40 MEQ: 10 CAPSULE, COATED, EXTENDED RELEASE ORAL at 21:05

## 2022-10-12 RX ADMIN — TAZOBACTAM SODIUM AND PIPERACILLIN SODIUM 3.38 G: 375; 3 INJECTION, SOLUTION INTRAVENOUS at 10:53

## 2022-10-12 RX ADMIN — SODIUM CHLORIDE 2000 ML: 9 INJECTION, SOLUTION INTRAVENOUS at 09:45

## 2022-10-12 RX ADMIN — MORPHINE SULFATE 2 MG: 2 INJECTION, SOLUTION INTRAMUSCULAR; INTRAVENOUS at 08:28

## 2022-10-12 RX ADMIN — HYOSCYAMINE SULFATE: 16 SOLUTION at 22:00

## 2022-10-12 RX ADMIN — TAZOBACTAM SODIUM AND PIPERACILLIN SODIUM 3.38 G: 375; 3 INJECTION, SOLUTION INTRAVENOUS at 18:03

## 2022-10-12 RX ADMIN — MAGNESIUM SULFATE HEPTAHYDRATE 4 G: 2 INJECTION, SOLUTION INTRAVENOUS at 16:04

## 2022-10-12 RX ADMIN — Medication 10 ML: at 09:44

## 2022-10-12 RX ADMIN — MORPHINE SULFATE 2 MG: 2 INJECTION, SOLUTION INTRAMUSCULAR; INTRAVENOUS at 03:33

## 2022-10-12 NOTE — CONSULTS
"Nutrition Services    Patient Name: Uche Pereyra  YOB: 1960  MRN: [id number]  Admission date: 10/11/2022      CLINICAL NUTRITION ASSESSMENT      Reason for Assessment  Identified at risk by screening criteria, BMI, Malnutrition Severity Assessment, Physician consult     H&P:    History reviewed. No pertinent past medical history.     Current Problems:   Active Hospital Problems    Diagnosis    • **Sepsis (HCC)    • Severe malnutrition (HCC)         Nutrition/Diet History         Narrative     Patient admitted with foot infection.  BMI 14.9 on admission.  Patient is unable to quantify weight history, but thinks he has lost at least 10 lbs in the past month.  Patient has obvious severe total body muscle wasting and fat loss as noted below.     Currently NPO for procedure.  Discussed in multidisciplinary rounds.  Patient is at very high risk for developing refeeding syndrome once diet is resumed.  Plan to watch electrolytes closely once patient starts taking PO.       Anthropometrics        Current Height, Weight Height: 177.8 cm (70\")  Weight: 47 kg (103 lb 9.9 oz)   Current BMI Body mass index is 14.87 kg/m².       Weight Hx  Wt Readings from Last 30 Encounters:   10/11/22 2147 47 kg (103 lb 9.9 oz)   10/11/22 1703 46.4 kg (102 lb 4.7 oz)            Wt Change Observation -8.8% x 1 month per patient report     Estimated/Assessed Needs       Energy Requirements 30 kcal/kg adj BW   EST Needs (kcal/day) 1605 kcal        Protein Requirements 1.0-1.2 g/kg adj BW   EST Daily Needs (g/day) 54-64       Fluid Requirements 30 ml/kg adj BW    Estimated Needs (mL/day) 1605 ml fluid      Labs/Medications         Pertinent Labs Reviewed.   Results from last 7 days   Lab Units 10/12/22  0315 10/11/22  1943 10/11/22  1744   SODIUM mmol/L 123* 121* 119*   POTASSIUM mmol/L 4.2 4.2 5.3*   CHLORIDE mmol/L 89* 87* 83*   CO2 mmol/L 21.5* 23.1 21.6*   BUN mg/dL 13 15 18   CREATININE mg/dL 0.41* 0.49* 0.55*   CALCIUM mg/dL " 9.4 9.1 10.3   BILIRUBIN mg/dL  --   --  0.4   ALK PHOS U/L  --   --  348*   ALT (SGPT) U/L  --   --  67*   AST (SGOT) U/L  --   --  42*   GLUCOSE mg/dL 103* 104* 127*     Results from last 7 days   Lab Units 10/12/22  0315 10/11/22  1943 10/11/22  1744   MAGNESIUM mg/dL  --   --  1.9   PHOSPHORUS mg/dL  --  3.0  --    HEMOGLOBIN g/dL 9.3*  --  11.1*   HEMATOCRIT % 26.4*  --  32.5*   TRIGLYCERIDES mg/dL 84  --   --      No results found for: COVID19  No results found for: HGBA1C      Pertinent Medications Reviewed.     Current Nutrition Orders & Evaluation of Intake       Oral Nutrition     Current PO Diet NPO Diet NPO Type: Strict NPO   Supplement No active supplement orders       Malnutrition Severity Assessment      Patient meets criteria for : Severe Malnutrition  Malnutrition Type (last 8 hours)     Malnutrition Severity Assessment     Row Name 10/12/22 0833       Malnutrition Severity Assessment    Malnutrition Type Starvation - Related Malnutrition    Row Name 10/12/22 0833       Muscle Loss    Loss of Muscle Mass Findings Severe    [place] Region Severe - deep hollowing/scooping, lack of muscle to touch, facial bones well defined    Clavicle Bone Region Severe - protruding prominent bone    Acromion Bone Region Severe - squared shoulders, bones, and acromion process protrusion prominent    Scapular Bone Region Severe - prominent bones, depressions easily visible between ribs, scapula, spine, shoulders    Dorsal Hand Region Severe - prominent depression    Patellar Region Severe - prominent bone, square looking, very little muscle definition    Anterior Thigh Region Severe - line/depression along thigh, obviously thin    Posterior Calf Region Severe - thin with very little definition/firmness    Row Name 10/12/22 0833       Fat Loss    Subcutaneous Fat Loss Findings Severe    Orbital Region  Severe - pronounced hollowness/depression, dark circles, loose saggy skin    Upper Arm Region Severe - mostly skin, very  little space between folds, fingers touch    Thoracic & Lumbar Region Severe - ribs visible with prominent depressions, iliac crest very prominent    Row Name 10/12/22 0801       Criteria Met (Must meet criteria for severity in at least 2 of these categories: M Wasting, Fat Loss, Fluid, Secondary Signs, Wt. Status, Intake)    Patient meets criteria for  Severe Malnutrition                   Nutrition Diagnosis         Nutrition Dx Problem 1 Severe malnutrition related to inadequate energy Intake as evidenced by inadequate energy intake., unintended wt change. and body composition changes.       Nutrition Intervention         Currently NPO.   Will follow as diet is advanced.       Medical Nutrition Therapy/Nutrition Education          Learner     Readiness N/A  N/A     Method     Response N/A  N/A     Monitor/Evaluation        Monitor Per protocol, RFS, Diet advancement       Nutrition Discharge Plan         To be determined       Electronically signed by:  Alisa Owusu RD  10/12/22 08:37 EDT

## 2022-10-12 NOTE — PROGRESS NOTES
"[facility] - Clinical Pharmacy Department    Vancomycin Pharmacokinetic Note    Uche Pereyra is a 62 y.o. male who is on day 2 of pharmacy to dose vancomycin for complicated skin and soft tissue infection.    Target level: AUC24 400-600  Consulting Provider: Joyce  Current Vancomycin Dose: 1000 mg IV every 12 hours  Planned Duration of Therapy: 7 days   Other Antimicrobials: Piperacillin/Tazobactam    Allergies  Allergies as of 10/11/2022    (No Known Allergies)       Microbiology:  Microbiology Results (last 10 days)       Procedure Component Value - Date/Time    MRSA Screen, PCR (Inpatient) - Swab, Nares [[id number]]  (Normal) Collected: 10/11/22 2242    Lab Status: Final result Specimen: Swab from Nares Updated: 10/12/22 0207     MRSA PCR No MRSA Detected    Narrative:      The negative predictive value of this diagnostic test is high and should only be used to consider de-escalating anti-MRSA therapy. A positive result may indicate colonization with MRSA and must be correlated clinically.    Wound Culture - Wound, Leg, Right [[id number]] Collected: 10/11/22 1720    Lab Status: Preliminary result Specimen: Wound from Leg, Right Updated: 10/11/22 1749     Gram Stain Few (2+) Gram positive cocci in pairs and clusters      Few (2+) Gram negative bacilli            Vitals/Labs/I&O  Visit Vitals  /64 (BP Location: Right arm, Patient Position: Lying)   Pulse 118   Temp 97 °F (36.1 °C) (Oral)   Resp 22   Ht 177.8 cm (70\")   Wt 47 kg (103 lb 9.9 oz)   SpO2 96%   BMI 14.87 kg/m²     Temp (24hrs), Av °F (36.7 °C), Min:97 °F (36.1 °C), Max:98.6 °F (37 °C)      Results from last 7 days   Lab Units 10/12/22  0315 10/11/22  1744   WBC 10*3/mm3 20.56* 20.32*     Results from last 7 days   Lab Units 10/11/22  1744   LACTATE mmol/L 1.9                       Estimated Creatinine Clearance: 122.6 mL/min (A) (by C-G formula based on SCr of 0.41 mg/dL (L)).  Results from last 7 days   Lab Units 10/12/22  0315 " "10/11/22  1943 10/11/22  1744   BUN mg/dL 13 15 18   CREATININE mg/dL 0.41* 0.49* 0.55*     Intake & Output (last 3 days)         10/09 0701  10/10 0700 10/10 0701  10/11 0700 10/11 0701  10/12 0700 10/12 0701  10/13 0700    I.V. (mL/kg)   915 (19.5)     IV Piggyback   1300 2300    Total Intake(mL/kg)   2215 (47.1) 2300 (48.9)    Urine (mL/kg/hr)   650     Total Output   650     Net   +1565 +2300                    Vancomycin Dosing and Level History:  Receiving Prior to Admission?: No  Is Patient on Dialysis (HD or PD) or Renal Replacement?: No                  Assessment/Plan:  Contrast Administered?: Yes,  and iopamidol (Isovue) on 10/11    Assessment:  Bayesian analysis of the most recent level(s) using made.com provides the following patient-specific pharmacokinetic parameters:   CL: 4.57 L/h   Vd: 39.2 L   T1/2: 7.84 h  If the predicted trough on this regimen is not within what was previously considered a \"target trough range\", the AUC24 (a stronger predictor of vancomycin efficacy) is predicted to achieve the accepted target of 400-600 mg*h/L. To best balance efficacy and toxicity, we will be targeting AUC24 in this patient rather than steady-state trough levels.     Recommendations/Plan:  Continue current vancomycin regimen of 1000 mg IV every 12 hours which gives the following predicted parameters based upon population pharmacokinetics and this patient's specific parameters.  Regimen: 1000 mg IV every 12 hours.  AUC24,ss: 438 mg/L.hr  PAUC*: 59 %  Ctrough,ss: 11.4 mg/L  Pconc*: 15 %  Tox.: 7 %  Obtain vancomycin level on 10/13 at 0900, prior to 4th dose or sooner if acute changes   Continue to monitor SCr    Thank you for involving pharmacy in this patient's care. Please contact pharmacy with any questions or concerns.                           Kecia Shafer [facility]  Clinical Pharmacist  10/12/22 11:07 EDT    "

## 2022-10-12 NOTE — SIGNIFICANT NOTE
10/12/22 1229   Plan   Plan HENRY Carreon followed up on web referral [id number]. This report DOES meet criteria

## 2022-10-12 NOTE — H&P (VIEW-ONLY)
[facility]   VASCULAR SURGERY CONSULT    Patient Name: Uche Pereyra  : 1960  MRN: [id number]  Primary Care Physician:  Provider, No Known  Date of admission: 10/11/2022    Subjective   Subjective     Chief Complaint: Right leg gangrene    HPI:    Uche Pereyra is a 62 y.o. male brought to the hospital with a complaint of right leg pain redness and swelling.  He indicates that he has been having problems for about 2-1/2 weeks.  On evaluation in the emergency room he was noted to have gangrene of the right lower extremity.  I have been asked to evaluate him from the vascular standpoint.  He has multiple medical concerns and there is evidence of gas in the lower leg by the CT scan and he has been admitted to the ICU.  The patient indicates some pain in the right leg but no other specific complaints.    Review of Systems    Noncontributory except for the history of present illness.    Personal History     History reviewed. No pertinent past medical history.    History reviewed. No pertinent surgical history.    Family History: family history is not on file. Otherwise pertinent FHx was reviewed and not pertinent to current issue.    Social History:  reports that he has been smoking cigarettes. He has a 50.00 pack-year smoking history. He has been exposed to tobacco smoke. He has never used smokeless tobacco. He reports that he does not currently use drugs after having used the following drugs: Marijuana.    Home Medications:         Allergies:  No Known Allergies    Objective   Objective     Vitals:   Temp:  [97 °F (36.1 °C)-98.6 °F (37 °C)] 97 °F (36.1 °C)  Heart Rate:  [116-141] 118  Resp:  [16-22] 22  BP: ()/(58-90) 106/64  Flow (L/min):  [0] 0    Physical Exam   General: Alert, no acute distress.  Neck: Supple  Heart: Regular rate  Lungs: Clear  Abdomen: Benign  Extremities: The right leg is wrapped in pads from just below the knee to the foot, it feels boggy to palpation through the pads.   No significant tenderness.  The left foot demonstrates some cyanotic changes.  The distal right thigh appears without any skin changes, grossly normal color and temperature, no crepitus.    Diagnostic studies: A CTA dated 10/11/2022 demonstrates occlusion of the right external iliac, right common femoral artery, right superficial femoral artery.  Some flow can be seen in the lower leg.  There is extensive callus formation seen in the soft tissues of the right lower leg as well as within the marrow spaces.    Sodium: On admission 119, most recent 123.  Lactate: 1.9  WBC: On admission 20.32, most recent 20.56.    Assessment & Plan   Assessment / Plan     Active Hospital Problems:  Active Hospital Problems    Diagnosis    • **Sepsis (HCC)    • Severe malnutrition (HCC)        Assessment/plan:   Mr. Pereyra presents with gangrene of the right lower extremity associated with a gas-forming organism.  He appears to be stable at this time and has multiple comorbidities to include severe hyponatremia which would represent an immediate risk.  I have discussed the case with ICU and anesthesia and would prefer to make some corrections as long as he remains reasonably stable.  We will tentatively schedule for surgery tomorrow anticipating that his electrolytes will be improved.  If there appears to be any significant deterioration we will reassess the plan.  Agree at this time with current management to include antibiotics, fluid resuscitation, electrolyte corrections and care.  I have advised Mr. Pereyra that he will need a right above-knee amputation.  I advised him that if possible this will be done as a 1 stage with closure of the wound, but it may be necessary to leave open if there is any evidence of infection at the level of amputation.  I have discussed with the patient extensively the mechanics of the procedure, the indications, benefits, risks, alternatives as well as potential complications to include but not  limited to infection, bleeding, transfusion, reoperation, death.  He appears to understand and desires to proceed.        Electronically signed by hGulam Whittington MD, 10/12/22, 9:30 AM EDT.

## 2022-10-12 NOTE — PROGRESS NOTES
[facility]   Hospitalist Progress Note  Date: 10/12/2022  Patient Name: Uche Pereyra    Subjective   Subjective     Chief Complaint:   Leg pain    Summary:   62-year-old male presents to the hospital with right leg pain.  The patient is intermittently homeless usually living in a park in [place] but lives with his sister locally in the winter months.  He has been living with her since August and began having trouble with his leg several weeks ago but history is very inconsistent.  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  Severe hyponatremia, septic on presentation initiated IV antibiotics and placed in the ICU.  Vascular surgery planning for amputation.      Interval Followup: Patient still having significant right lower extremity pain, states that the pain medication is helping but does not last very long.  Understands he will need to have surgery.  Very vague in regards to symptom onset    Review of Systems   No nausea vomiting no chest pain currently    Objective   Objective     Vitals:   Temp:  [97 °F (36.1 °C)-98.6 °F (37 °C)] 98.6 °F (37 °C)  Heart Rate:  [116-141] 118  Resp:  [16-22] 22  BP: ()/(58-90) 106/64  Flow (L/min):  [0] 0  Physical Exam   Gen: NAD, cachectic male sitting up in bed, severe muscle wasting  HEENT: NCAT, mmm, poor dentition  Resp: CTAB no wrr, no dyspnea  CV: RRR no mrg  GI: Abdomen soft NT, ND +bs  Psych: AOx3, normal mood and affect  MSK: Right lower extremity with extensive gangrene      Result Review    Result Review:  I have personally reviewed the results from 10/12/2022 12:54 EDT and agree with these findings:  [x]  Laboratory   Sodium 123  White count 20  Hemoglobin 9.3  [x]  Microbiology wound culture: Mixed gram-negative erma, GPC's in pairs  [x]  Radiology liver ultrasound: Normal size without focal lesion  CTA: Extensive gas within the right foot and right lower leg  compatible with cellulitis and infection, complete occlusion of bilateral external iliac common femoral and superficial femoral  [x]  EKG/Telemetry telemetry personally reviewed: Sinus rhythm  []  Cardiology/Vascular   []  Pathology  []  Old records  []  Other:    Assessment & Plan   Assessment / Plan     Assessment/Plan:  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

## 2022-10-12 NOTE — SIGNIFICANT NOTE
10/12/22 1145   Coping/Psychosocial   Observed Emotional State calm;cooperative   Verbalized Emotional State hopefulness;relief   Trust Relationship/Rapport empathic listening provided   Involvement in Care interacting with patient   Additional Documentation Spiritual Care (Group)   Spiritual Care   Use of Spiritual Resources non-[employer]ian use of spiritual care   Spiritual Care Source  initiative   Spiritual Care Follow-Up follow-up, none required as presently assessed   Response to Spiritual Care engaged in conversation;receptive of support;thanks expressed   Spiritual Care Interventions supportive conversation provided   Spiritual Care Visit Type initial   Receptivity to Spiritual Care visit welcomed

## 2022-10-12 NOTE — SIGNIFICANT NOTE
Wound Eval / Progress Noted    ROSEMARIE Hatfield     Patient Name: Uche Pereyra  : 1960  MRN: [id number]  Today's Date: 10/12/2022                 Admit Date: 10/11/2022    Visit Dx:    ICD-10-CM ICD-9-CM   1. Sepsis, due to unspecified organism, unspecified whether acute organ dysfunction present ([facility])  A41.9 038.9     995.91   2. 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 No pertinent past medical history.     History reviewed. No pertinent surgical history.      Physical Assessment:  Wound coccyx (Active)   Wound Image   10/12/22 1555   Pressure Injury Stage 3 10/12/22 1555   Dressing Appearance intact;moist drainage 10/12/22 1555   Closure None 10/12/22 1555   Base red;moist;slough 10/12/22 1555   Black (%), Wound Tissue Color 20 10/11/22 2300   Red (%), Wound Tissue Color 50 10/12/22 1555   Yellow (%), Wound Tissue Color 50 10/12/22 1555   Periwound blanchable;redness 10/12/22 1555   Periwound Temperature warm 10/12/22 1555   Periwound Skin Turgor soft 10/12/22 1555   Edges open 10/12/22 1555   Wound Length (cm) 4 cm 10/12/22 1555   Wound Width (cm) 1 cm 10/12/22 1555   Wound Surface Area (cm^2) 4 cm^2 10/12/22 1555   Drainage Characteristics/Odor serosanguineous 10/12/22 1555   Drainage Amount small 10/12/22 1555   Care, Wound cleansed with;sterile normal saline 10/12/22 1555   Dressing Care dressing applied;hydrofiber;silver impregnated;silicone;border dressing 10/12/22 1555   Periwound Care absorptive dressing applied 10/12/22 1555       Wound Right proximal leg (Active)   Pressure Injury Stage U 10/12/22 0537   Dressing Appearance dry;intact 10/12/22 08   Closure None 10/12/22 0527   Base bleeding;exposed  structure;non-granulating;necrotic 10/12/22 0527   Periwound Temperature warm 10/12/22 0527   Periwound Skin Turgor soft 10/12/22 0527   Edges irregular 10/12/22 0527   Drainage Characteristics/Odor bleeding controlled;brown;malodorous;other (see comments) 10/12/22 0527   Drainage Amount moderate 10/12/22 0527   Care, Wound cleansed with;soap and water;sterile normal saline;dressing removed 10/12/22 0527   Dressing Care dressing changed;gauze, dry;abdominal pad;gauze 10/12/22 0527   Periwound Care cleansed with pH balanced cleanser 10/12/22 0527       Wound Right anterior (Active)   Dressing Appearance dry;intact 10/12/22 0800       Wound 10/11/22 Right other (see comments) foot (Active)   Dressing Appearance dry;intact 10/12/22 0800        Wound Check / Follow-up:  Patient seen today for wound consult. Patient pending surgical intervention to right lower leg tomorrow. Foul odor present upon entry to room. Photos reviewed. Per Primary RN, maggots remaining in wound. Discussed with patient the plan to assess leg again. Patient initially agreeable but then states he would like to defer for now because it causes pain and it's hard for him to look at. Patient is agreeable to allowing wound care later tonight. Explained that I will add a solution to apply to leg when that dressing is performed. Patient verbalized understanding.   Stage III pressure injuries to bilateral gluteal aspects around coccyx. 4 separate openings noted spanning over a 4 cm in length and 1 cm in width to each gluteal aspect. Recommending daily dressing change with silver impregnated hydrofiber and silicone border dressing.   Patient overall is emaciated and very poorly kept. He states he has had difficulties getting up and doing anything since his wife passed. Nails overgrown with debris and dirt underneath them. Plantar aspects of feet soiled.     Impression: Pending surgical procedure to right lower leg. Pressure injuries to coccyx. Poor hygiene.  High risk for skin breakdown    Short term goals:  Daily dressings to coccyx. BID dressing to RLE until surgery.     Katy Cuevas RN    10/12/2022    17:40 EDT

## 2022-10-12 NOTE — PROGRESS NOTES
"[facility] Clinical Pharmacy Services: Vancomycin Pharmacokinetic Initial Consult Note    Uche Pereyra is a 62 y.o. male who is on day 1 of pharmacy to dose vancomycin.    Indication: Sepsis  Consulting Provider: Dr. Cook  Planned Duration of Therapy: 7 days  Loading Dose Ordered or Given: 1000 mg (20 mg/kg) on 10/11 at 1823  MRSA PCR performed: 10/11; Result: pending  Culture/Source: blood cx pending x 2  Target: -600 mg/L.hr   Other Antimicrobials: Zosyn    Vitals/Labs  Ht: 177.8 cm (70\"); Wt: 46.4 kg (102 lb 4.7 oz)  Temp Readings from Last 1 Encounters:   10/11/22 98.6 °F (37 °C) (Oral)    Estimated Creatinine Clearance: 101.3 mL/min (A) (by C-G formula based on SCr of 0.49 mg/dL (L)).     Results from last 7 days   Lab Units 10/11/22  1943 10/11/22  1744   CREATININE mg/dL 0.49* 0.55*   WBC 10*3/mm3  --  20.32*     Assessment/Plan:    Vancomycin Dose:  1000 mg IV every 12 hours  Predictive AUC level for the dose ordered is 523 mg/L.hr, which is within the target of 400-600 mg/L.hr  Vanc Trough has been ordered for 10/13 at 0900     Pharmacy will follow patient's kidney function and will adjust doses and obtain levels as necessary. Thank you for involving pharmacy in this patient's care. Please contact pharmacy with any questions or concerns.                           Ciro Shelton, [facility]  Clinical Pharmacist    "

## 2022-10-12 NOTE — CONSULTS
Pulmonary / Critical Care Consult Note      Patient Name: Uche Pereyra  : 1960  MRN: [id number]  Primary Care Physician:  Provider, No Known  Referring Physician: Karlie Hernandez MD  Date of admission: 10/11/2022    Subjective   Subjective     Reason for Consult/ Chief Complaint:   Gas gangrene    HPI:  Uche Pereyra is a 62 y.o. male came into the hospital yesterday with complaint of right leg pain, tenderness and swelling.  He reportedly was homeless in [place] and has been living with his sister here for the last few months.  Over the last few weeks has had progressively worsening right lower extremity pain.  When he came in he was having severe leg pain was hypotensive and had findings concerning for sepsis.  Also had a maggot infested wound and gas gangrene noted on his right leg extending up above the knee.  He is stabilized overnight with IV fluids however has multiple electrolyte abnormalities.  He is very weak and frail.  He also has right lower extremity pain.  Vascular surgery has been consulted for source control.  He also has multiple electrolyte abnormalities including profound hypoglycemia.    Review of Systems  Constitutional symptoms:   Fatigue, poor appetite, weight loss, otherwise denied complaints  Ear, nose, throat: Denied complaints  Cardiovascular:  Denied complaints  Respiratory: Denied complaints  Gastrointestinal: Denied complaints  Musculoskeletal: Weakness, right lower extremity pain, otherwise denied complaints  Genitourinary: Denied complaints  Allergy / Immunology: Denied complaints  Hematologic: Denied complaints  Neurologic: Denied complaints  Skin: Denied complaints      Personal History     History reviewed. No pertinent past medical history.    History reviewed. No pertinent surgical history.    Family History: No pulmonary comorbidities noted in primary family members    Social History:  reports that he has been smoking cigarettes. He has a 50.00 pack-year  smoking history. He has been exposed to tobacco smoke. He has never used smokeless tobacco. He reports that he does not currently use drugs after having used the following drugs: Marijuana.   Active cigarette smoker  Occasional social alcohol use  Was homeless living in a park in [place] for a number years.  Has been with his sister in town for the last couple of months    Home Medications:  None    Allergies:  No Known Allergies    Objective    Objective     Vitals:   Temp:  [97 °F (36.1 °C)-98.6 °F (37 °C)] 97 °F (36.1 °C)  Heart Rate:  [116-141] 118  Resp:  [16-22] 22  BP: ()/(58-90) 106/64  Flow (L/min):  [0] 0    Physical Exam:  Vital Signs Reviewed   General: Thin frail cachectic male, Alert, NAD.    HEENT:  PERRL, EOMI.  OP, nares clear  Neck:  Supple, no JVD, no thyromegaly  Chest:  good aeration, clear to auscultation bilaterally, tympanic to percussion bilaterally, no work of breathing noted, scaphoid chest  CV: Sinus tachycardia no MGR, pulses 2+, equal.  Abd:  Soft, NT, ND, + BS, no HSM  EXT:  no clubbing, no cyanosis, right lower extremity has foul-smelling gangrene with gas-forming crepitus and multiple wounds that still have some maggots in them despite irrigation  Neuro:  A&Ox3, CN grossly intact, no focal deficits.  Skin: No rashes or lesions noted      Result Review    Result Review:  I have personally reviewed the results from the time of this admission to 10/12/2022 11:26 EDT and agree with these findings:  [x]  Laboratory  [x]  Microbiology  [x]  Radiology  [x]  EKG/Telemetry   [x]  Cardiology/Vascular   []  Pathology  []  Old records  []  Other:  Most notable findings include:   CT angiogram with vascular occlusion to the right lower extremity.  Also extensive gas gangrene of the right lower extremity extending past the knee      Lab 10/12/22  0315 10/11/22  1943 10/11/22  1744   WBC 20.56*  --  20.32*   HEMOGLOBIN 9.3*  --  11.1*   HEMATOCRIT 26.4*  --  32.5*   PLATELETS 667*  --   792*   SODIUM 123* 121* 119*   POTASSIUM 4.2 4.2 5.3*   CHLORIDE 89* 87* 83*   CO2 21.5* 23.1 21.6*   BUN 13 15 18   CREATININE 0.41* 0.49* 0.55*   GLUCOSE 103* 104* 127*   CALCIUM 9.4 9.1 10.3   PHOSPHORUS  --  3.0  --    TOTAL PROTEIN  --   --  8.4   ALBUMIN  --   --  3.20*   GLOBULIN  --   --  5.2         Assessment & Plan   Assessment / Plan     Active Hospital Problems:  Active Hospital Problems    Diagnosis    • **Sepsis (HCC)    • Severe malnutrition (HCC)    • Gas gangrene (HCC)    • 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 (HCC)      Added automatically from request for surgery [id number]       Impression:  Gas gangrene of right lower extremity from unspecified organism  Severe sepsis  Arterial occlusion of right lower extremity, chronic  Maggot infestation of gas gangrene right lower extremity  Hyponatremia  Hyperglycemia  Anemia of chronic disease  Leukocytosis  Transaminitis  Severe protein calorie malnutrition with muscle wasting, poor oral intake and cachexia  Ongoing tobacco use of cigarettes    Plan:  This patient has arterial occlusion to the right lower extremity and has gas gangrene with maggot infestation of the right lower extremity and severe sepsis.  I did discuss the case with vascular surgery and source control would likely be in the form of a right AKA.  Unsure at this time whether or not he will be able to fully close this intraoperatively.  Also will need to optimize the patient's hemodynamics and labs, in particular the patient sodium, prior to going to the OR.  At this time they are planning to go to the operating room tomorrow  Bolused 2 L normal saline and continue LR at 100 cc/h  Trend renal function and electrolytes.  Sodium slowly improving.  Agree with vancomycin and Zosyn.  Will de-escalate based off cultures  Will need diet after surgery as well as supplements per dietitian.  Will need to monitor renal function and electrolytes closely for refeeding  syndrome  Continue wound care.  Pain control with IV morphine and as needed Toradol/Tylenol    DVT prophylaxis:  Mechanical DVT prophylaxis orders are present.     Code Status and Medical Interventions:   Ordered at: 10/11/22 1933     Code Status (Patient has no pulse and is not breathing):    CPR (Attempt to Resuscitate)     Medical Interventions (Patient has pulse or is breathing):    Full Support        The patient is critically ill in the ICU with severe sepsis, gas gangrene of right lower extremity, severe malnutrition, arterial occlusion right lower extremity. Multidisciplinary bedside critical care rounds were performed with nursing staff, respiratory therapy, pharmacy, nutritional services, social work. I have personally reviewed the chart, labs and any pertinent imaging available.  I have spent 31 minutes of critical care time, excluding procedures, in the care of this patient.    Electronically signed by Boogie Eller MD, 10/12/22, 11:29 AM EDT.

## 2022-10-12 NOTE — CONSULTS
[facility]   VASCULAR SURGERY CONSULT    Patient Name: Uche Pereyra  : 1960  MRN: [id number]  Primary Care Physician:  Provider, No Known  Date of admission: 10/11/2022    Subjective   Subjective     Chief Complaint: Right leg gangrene    HPI:    Uche Pereyra is a 62 y.o. male brought to the hospital with a complaint of right leg pain redness and swelling.  He indicates that he has been having problems for about 2-1/2 weeks.  On evaluation in the emergency room he was noted to have gangrene of the right lower extremity.  I have been asked to evaluate him from the vascular standpoint.  He has multiple medical concerns and there is evidence of gas in the lower leg by the CT scan and he has been admitted to the ICU.  The patient indicates some pain in the right leg but no other specific complaints.    Review of Systems    Noncontributory except for the history of present illness.    Personal History     History reviewed. No pertinent past medical history.    History reviewed. No pertinent surgical history.    Family History: family history is not on file. Otherwise pertinent FHx was reviewed and not pertinent to current issue.    Social History:  reports that he has been smoking cigarettes. He has a 50.00 pack-year smoking history. He has been exposed to tobacco smoke. He has never used smokeless tobacco. He reports that he does not currently use drugs after having used the following drugs: Marijuana.    Home Medications:         Allergies:  No Known Allergies    Objective   Objective     Vitals:   Temp:  [97 °F (36.1 °C)-98.6 °F (37 °C)] 97 °F (36.1 °C)  Heart Rate:  [116-141] 118  Resp:  [16-22] 22  BP: ()/(58-90) 106/64  Flow (L/min):  [0] 0    Physical Exam   General: Alert, no acute distress.  Neck: Supple  Heart: Regular rate  Lungs: Clear  Abdomen: Benign  Extremities: The right leg is wrapped in pads from just below the knee to the foot, it feels boggy to palpation through the pads.   No significant tenderness.  The left foot demonstrates some cyanotic changes.  The distal right thigh appears without any skin changes, grossly normal color and temperature, no crepitus.    Diagnostic studies: A CTA dated 10/11/2022 demonstrates occlusion of the right external iliac, right common femoral artery, right superficial femoral artery.  Some flow can be seen in the lower leg.  There is extensive callus formation seen in the soft tissues of the right lower leg as well as within the marrow spaces.    Sodium: On admission 119, most recent 123.  Lactate: 1.9  WBC: On admission 20.32, most recent 20.56.    Assessment & Plan   Assessment / Plan     Active Hospital Problems:  Active Hospital Problems    Diagnosis    • **Sepsis (HCC)    • Severe malnutrition (HCC)        Assessment/plan:   Mr. Pereyra presents with gangrene of the right lower extremity associated with a gas-forming organism.  He appears to be stable at this time and has multiple comorbidities to include severe hyponatremia which would represent an immediate risk.  I have discussed the case with ICU and anesthesia and would prefer to make some corrections as long as he remains reasonably stable.  We will tentatively schedule for surgery tomorrow anticipating that his electrolytes will be improved.  If there appears to be any significant deterioration we will reassess the plan.  Agree at this time with current management to include antibiotics, fluid resuscitation, electrolyte corrections and care.  I have advised Mr. Pereyra that he will need a right above-knee amputation.  I advised him that if possible this will be done as a 1 stage with closure of the wound, but it may be necessary to leave open if there is any evidence of infection at the level of amputation.  I have discussed with the patient extensively the mechanics of the procedure, the indications, benefits, risks, alternatives as well as potential complications to include but not  limited to infection, bleeding, transfusion, reoperation, death.  He appears to understand and desires to proceed.        Electronically signed by Ghulam Whittington MD, 10/12/22, 9:30 AM EDT.

## 2022-10-12 NOTE — SIGNIFICANT NOTE
"   10/11/22 2004   Personal Safety   Safety Plan SW made reports [facility] hot[facility] with the following information, \" Pt presents to the ED with uncared for wounds, covered in maggots. Pt came with sister who states she is the patient's caregiver and gets defensive when asked about his care. Per MD, pt's wounds are so bad he will lose his leg. Pt is also homeless and stays with sister during cold/winter months. Pt usually lives in a park in [place] by choice. Pt has been living with sister since August. There are also 2 other adults and children in the home. Pt will be admitted for his wounds\" Report #[id number]     "

## 2022-10-13 ENCOUNTER — ANESTHESIA EVENT (OUTPATIENT)
Dept: PERIOP | Facility: HOSPITAL | Age: 62
End: 2022-10-13

## 2022-10-13 ENCOUNTER — ANESTHESIA (OUTPATIENT)
Dept: PERIOP | Facility: HOSPITAL | Age: 62
End: 2022-10-13

## 2022-10-13 LAB
ABO GROUP BLD: NORMAL
ABO GROUP BLD: NORMAL
ALBUMIN SERPL-MCNC: 2.3 G/DL (ref 3.5–5.2)
ALBUMIN/GLOB SERPL: 0.6 G/DL
ALP SERPL-CCNC: 232 U/L (ref 39–117)
ALT SERPL W P-5'-P-CCNC: 49 U/L (ref 1–41)
ANION GAP SERPL CALCULATED.3IONS-SCNC: 11 MMOL/L (ref 5–15)
AST SERPL-CCNC: 84 U/L (ref 1–40)
BASOPHILS # BLD AUTO: 0.04 10*3/MM3 (ref 0–0.2)
BASOPHILS NFR BLD AUTO: 0.2 % (ref 0–1.5)
BILIRUB SERPL-MCNC: 0.4 MG/DL (ref 0–1.2)
BLD GP AB SCN SERPL QL: NEGATIVE
BUN SERPL-MCNC: 6 MG/DL (ref 8–23)
BUN/CREAT SERPL: 16.7 (ref 7–25)
CALCIUM SPEC-SCNC: 8.2 MG/DL (ref 8.6–10.5)
CHLORIDE SERPL-SCNC: 90 MMOL/L (ref 98–107)
CO2 SERPL-SCNC: 21 MMOL/L (ref 22–29)
CREAT SERPL-MCNC: 0.36 MG/DL (ref 0.76–1.27)
D-LACTATE SERPL-SCNC: 1.3 MMOL/L (ref 0.5–2)
DEPRECATED RDW RBC AUTO: 54.4 FL (ref 37–54)
EGFRCR SERPLBLD CKD-EPI 2021: 126.6 ML/MIN/1.73
EOSINOPHIL # BLD AUTO: 0.03 10*3/MM3 (ref 0–0.4)
EOSINOPHIL NFR BLD AUTO: 0.1 % (ref 0.3–6.2)
ERYTHROCYTE [DISTWIDTH] IN BLOOD BY AUTOMATED COUNT: 15.9 % (ref 12.3–15.4)
GLOBULIN UR ELPH-MCNC: 4.1 GM/DL
GLUCOSE SERPL-MCNC: 94 MG/DL (ref 65–99)
HCT VFR BLD AUTO: 25.8 % (ref 37.5–51)
HGB BLD-MCNC: 8.6 G/DL (ref 13–17.7)
IMM GRANULOCYTES # BLD AUTO: 0.25 10*3/MM3 (ref 0–0.05)
IMM GRANULOCYTES NFR BLD AUTO: 1.1 % (ref 0–0.5)
LYMPHOCYTES # BLD AUTO: 1.68 10*3/MM3 (ref 0.7–3.1)
LYMPHOCYTES NFR BLD AUTO: 7.6 % (ref 19.6–45.3)
MAGNESIUM SERPL-MCNC: 2 MG/DL (ref 1.6–2.4)
MCH RBC QN AUTO: 30.7 PG (ref 26.6–33)
MCHC RBC AUTO-ENTMCNC: 33.3 G/DL (ref 31.5–35.7)
MCV RBC AUTO: 92.1 FL (ref 79–97)
MONOCYTES # BLD AUTO: 2.45 10*3/MM3 (ref 0.1–0.9)
MONOCYTES NFR BLD AUTO: 11.1 % (ref 5–12)
NEUTROPHILS NFR BLD AUTO: 17.61 10*3/MM3 (ref 1.7–7)
NEUTROPHILS NFR BLD AUTO: 79.9 % (ref 42.7–76)
NRBC BLD AUTO-RTO: 0 /100 WBC (ref 0–0.2)
PLATELET # BLD AUTO: 546 10*3/MM3 (ref 140–450)
PMV BLD AUTO: 7.9 FL (ref 6–12)
POTASSIUM SERPL-SCNC: 3.9 MMOL/L (ref 3.5–5.2)
PROT SERPL-MCNC: 6.4 G/DL (ref 6–8.5)
RBC # BLD AUTO: 2.8 10*6/MM3 (ref 4.14–5.8)
RH BLD: NEGATIVE
RH BLD: NEGATIVE
SODIUM SERPL-SCNC: 122 MMOL/L (ref 136–145)
T&S EXPIRATION DATE: NORMAL
VANCOMYCIN TROUGH SERPL-MCNC: <4 MCG/ML (ref 5–20)
WBC NRBC COR # BLD: 22.06 10*3/MM3 (ref 3.4–10.8)

## 2022-10-13 PROCEDURE — 25010000002 PIPERACILLIN SOD-TAZOBACTAM PER 1 G: Performed by: FAMILY MEDICINE

## 2022-10-13 PROCEDURE — 86901 BLOOD TYPING SEROLOGIC RH(D): CPT

## 2022-10-13 PROCEDURE — 25010000002 DEXAMETHASONE PER 1 MG: Performed by: NURSE ANESTHETIST, CERTIFIED REGISTERED

## 2022-10-13 PROCEDURE — 99291 CRITICAL CARE FIRST HOUR: CPT | Performed by: INTERNAL MEDICINE

## 2022-10-13 PROCEDURE — 25010000002 VANCOMYCIN 5 G RECONSTITUTED SOLUTION: Performed by: INTERNAL MEDICINE

## 2022-10-13 PROCEDURE — 25010000002 ONDANSETRON PER 1 MG: Performed by: NURSE ANESTHETIST, CERTIFIED REGISTERED

## 2022-10-13 PROCEDURE — 25010000002 FENTANYL CITRATE (PF) 50 MCG/ML SOLUTION: Performed by: NURSE ANESTHETIST, CERTIFIED REGISTERED

## 2022-10-13 PROCEDURE — 83605 ASSAY OF LACTIC ACID: CPT | Performed by: INTERNAL MEDICINE

## 2022-10-13 PROCEDURE — 88311 DECALCIFY TISSUE: CPT | Performed by: SURGERY

## 2022-10-13 PROCEDURE — 25010000002 MIDAZOLAM PER 1 MG: Performed by: NURSE ANESTHETIST, CERTIFIED REGISTERED

## 2022-10-13 PROCEDURE — 27590 AMPUTATE LEG AT THIGH: CPT | Performed by: SURGERY

## 2022-10-13 PROCEDURE — 83935 ASSAY OF URINE OSMOLALITY: CPT | Performed by: INTERNAL MEDICINE

## 2022-10-13 PROCEDURE — 83735 ASSAY OF MAGNESIUM: CPT | Performed by: INTERNAL MEDICINE

## 2022-10-13 PROCEDURE — 25010000002 VANCOMYCIN 5 G RECONSTITUTED SOLUTION: Performed by: FAMILY MEDICINE

## 2022-10-13 PROCEDURE — 85025 COMPLETE CBC W/AUTO DIFF WBC: CPT | Performed by: INTERNAL MEDICINE

## 2022-10-13 PROCEDURE — 86900 BLOOD TYPING SEROLOGIC ABO: CPT

## 2022-10-13 PROCEDURE — 86900 BLOOD TYPING SEROLOGIC ABO: CPT | Performed by: ANESTHESIOLOGY

## 2022-10-13 PROCEDURE — 25010000002 MORPHINE PER 10 MG: Performed by: FAMILY MEDICINE

## 2022-10-13 PROCEDURE — 0Y6C0Z3 DETACHMENT AT RIGHT UPPER LEG, LOW, OPEN APPROACH: ICD-10-PCS | Performed by: SURGERY

## 2022-10-13 PROCEDURE — 88307 TISSUE EXAM BY PATHOLOGIST: CPT | Performed by: SURGERY

## 2022-10-13 PROCEDURE — 25010000002 PROPOFOL 10 MG/ML EMULSION: Performed by: NURSE ANESTHETIST, CERTIFIED REGISTERED

## 2022-10-13 PROCEDURE — 80053 COMPREHEN METABOLIC PANEL: CPT | Performed by: INTERNAL MEDICINE

## 2022-10-13 PROCEDURE — 86901 BLOOD TYPING SEROLOGIC RH(D): CPT | Performed by: ANESTHESIOLOGY

## 2022-10-13 PROCEDURE — 80202 ASSAY OF VANCOMYCIN: CPT | Performed by: FAMILY MEDICINE

## 2022-10-13 PROCEDURE — 99233 SBSQ HOSP IP/OBS HIGH 50: CPT | Performed by: INTERNAL MEDICINE

## 2022-10-13 PROCEDURE — 86850 RBC ANTIBODY SCREEN: CPT | Performed by: ANESTHESIOLOGY

## 2022-10-13 PROCEDURE — 84300 ASSAY OF URINE SODIUM: CPT | Performed by: INTERNAL MEDICINE

## 2022-10-13 DEVICE — LIGACLIP MCA MULTIPLE CLIP APPLIERS, 20 SMALL CLIPS
Type: IMPLANTABLE DEVICE | Site: FEMUR | Status: FUNCTIONAL
Brand: LIGACLIP

## 2022-10-13 RX ORDER — LIDOCAINE HYDROCHLORIDE 20 MG/ML
INJECTION, SOLUTION EPIDURAL; INFILTRATION; INTRACAUDAL; PERINEURAL AS NEEDED
Status: DISCONTINUED | OUTPATIENT
Start: 2022-10-13 | End: 2022-10-13 | Stop reason: SURG

## 2022-10-13 RX ORDER — OXYCODONE HYDROCHLORIDE 5 MG/1
5 TABLET ORAL
Status: DISCONTINUED | OUTPATIENT
Start: 2022-10-13 | End: 2022-10-13 | Stop reason: HOSPADM

## 2022-10-13 RX ORDER — SODIUM CHLORIDE 9 MG/ML
INJECTION, SOLUTION INTRAVENOUS CONTINUOUS PRN
Status: DISCONTINUED | OUTPATIENT
Start: 2022-10-13 | End: 2022-10-13 | Stop reason: SURG

## 2022-10-13 RX ORDER — SODIUM CHLORIDE 9 MG/ML
100 INJECTION, SOLUTION INTRAVENOUS CONTINUOUS
Status: DISCONTINUED | OUTPATIENT
Start: 2022-10-13 | End: 2022-10-14

## 2022-10-13 RX ORDER — POTASSIUM CHLORIDE 750 MG/1
10 CAPSULE, EXTENDED RELEASE ORAL ONCE
Status: COMPLETED | OUTPATIENT
Start: 2022-10-13 | End: 2022-10-13

## 2022-10-13 RX ORDER — ONDANSETRON 2 MG/ML
4 INJECTION INTRAMUSCULAR; INTRAVENOUS ONCE AS NEEDED
Status: DISCONTINUED | OUTPATIENT
Start: 2022-10-13 | End: 2022-10-13 | Stop reason: HOSPADM

## 2022-10-13 RX ORDER — ONDANSETRON 2 MG/ML
INJECTION INTRAMUSCULAR; INTRAVENOUS AS NEEDED
Status: DISCONTINUED | OUTPATIENT
Start: 2022-10-13 | End: 2022-10-13 | Stop reason: SURG

## 2022-10-13 RX ORDER — ROCURONIUM BROMIDE 10 MG/ML
INJECTION, SOLUTION INTRAVENOUS AS NEEDED
Status: DISCONTINUED | OUTPATIENT
Start: 2022-10-13 | End: 2022-10-13 | Stop reason: SURG

## 2022-10-13 RX ORDER — PROMETHAZINE HYDROCHLORIDE 12.5 MG/1
25 TABLET ORAL ONCE AS NEEDED
Status: DISCONTINUED | OUTPATIENT
Start: 2022-10-13 | End: 2022-10-13 | Stop reason: HOSPADM

## 2022-10-13 RX ORDER — DEXAMETHASONE SODIUM PHOSPHATE 4 MG/ML
INJECTION, SOLUTION INTRA-ARTICULAR; INTRALESIONAL; INTRAMUSCULAR; INTRAVENOUS; SOFT TISSUE AS NEEDED
Status: DISCONTINUED | OUTPATIENT
Start: 2022-10-13 | End: 2022-10-13 | Stop reason: SURG

## 2022-10-13 RX ORDER — MIDAZOLAM HYDROCHLORIDE 1 MG/ML
2 INJECTION INTRAMUSCULAR; INTRAVENOUS ONCE
Status: COMPLETED | OUTPATIENT
Start: 2022-10-13 | End: 2022-10-13

## 2022-10-13 RX ORDER — SUCCINYLCHOLINE/SOD CL,ISO/PF 100 MG/5ML
SYRINGE (ML) INTRAVENOUS AS NEEDED
Status: DISCONTINUED | OUTPATIENT
Start: 2022-10-13 | End: 2022-10-13 | Stop reason: SURG

## 2022-10-13 RX ORDER — MEPERIDINE HYDROCHLORIDE 25 MG/ML
12.5 INJECTION INTRAMUSCULAR; INTRAVENOUS; SUBCUTANEOUS
Status: DISCONTINUED | OUTPATIENT
Start: 2022-10-13 | End: 2022-10-13 | Stop reason: HOSPADM

## 2022-10-13 RX ORDER — ESMOLOL HYDROCHLORIDE 10 MG/ML
INJECTION INTRAVENOUS AS NEEDED
Status: DISCONTINUED | OUTPATIENT
Start: 2022-10-13 | End: 2022-10-13 | Stop reason: SURG

## 2022-10-13 RX ORDER — SODIUM CHLORIDE, SODIUM LACTATE, POTASSIUM CHLORIDE, CALCIUM CHLORIDE 600; 310; 30; 20 MG/100ML; MG/100ML; MG/100ML; MG/100ML
9 INJECTION, SOLUTION INTRAVENOUS CONTINUOUS PRN
Status: DISCONTINUED | OUTPATIENT
Start: 2022-10-13 | End: 2022-10-13 | Stop reason: HOSPADM

## 2022-10-13 RX ORDER — GLYCOPYRROLATE 0.2 MG/ML
0.2 INJECTION INTRAMUSCULAR; INTRAVENOUS
Status: COMPLETED | OUTPATIENT
Start: 2022-10-13 | End: 2022-10-13

## 2022-10-13 RX ORDER — PROPOFOL 10 MG/ML
VIAL (ML) INTRAVENOUS AS NEEDED
Status: DISCONTINUED | OUTPATIENT
Start: 2022-10-13 | End: 2022-10-13 | Stop reason: SURG

## 2022-10-13 RX ORDER — EPHEDRINE SULFATE 50 MG/ML
INJECTION, SOLUTION INTRAVENOUS AS NEEDED
Status: DISCONTINUED | OUTPATIENT
Start: 2022-10-13 | End: 2022-10-13 | Stop reason: SURG

## 2022-10-13 RX ORDER — FENTANYL CITRATE 50 UG/ML
INJECTION, SOLUTION INTRAMUSCULAR; INTRAVENOUS AS NEEDED
Status: DISCONTINUED | OUTPATIENT
Start: 2022-10-13 | End: 2022-10-13 | Stop reason: SURG

## 2022-10-13 RX ORDER — MAGNESIUM HYDROXIDE 1200 MG/15ML
LIQUID ORAL AS NEEDED
Status: DISCONTINUED | OUTPATIENT
Start: 2022-10-13 | End: 2022-10-13 | Stop reason: HOSPADM

## 2022-10-13 RX ORDER — PROMETHAZINE HYDROCHLORIDE 25 MG/1
25 SUPPOSITORY RECTAL ONCE AS NEEDED
Status: DISCONTINUED | OUTPATIENT
Start: 2022-10-13 | End: 2022-10-13 | Stop reason: HOSPADM

## 2022-10-13 RX ORDER — PHENYLEPHRINE HCL IN 0.9% NACL 1 MG/10 ML
SYRINGE (ML) INTRAVENOUS AS NEEDED
Status: DISCONTINUED | OUTPATIENT
Start: 2022-10-13 | End: 2022-10-13 | Stop reason: SURG

## 2022-10-13 RX ORDER — ACETAMINOPHEN 500 MG
1000 TABLET ORAL ONCE
Status: COMPLETED | OUTPATIENT
Start: 2022-10-13 | End: 2022-10-13

## 2022-10-13 RX ADMIN — Medication 100 MG: at 19:23

## 2022-10-13 RX ADMIN — GLYCOPYRROLATE 0.2 MG: 0.2 INJECTION INTRAMUSCULAR; INTRAVENOUS at 18:29

## 2022-10-13 RX ADMIN — TAZOBACTAM SODIUM AND PIPERACILLIN SODIUM 3.38 G: 375; 3 INJECTION, SOLUTION INTRAVENOUS at 01:33

## 2022-10-13 RX ADMIN — PROPOFOL 150 MG: 10 INJECTION, EMULSION INTRAVENOUS at 19:23

## 2022-10-13 RX ADMIN — LIDOCAINE HYDROCHLORIDE 50 MG: 20 INJECTION, SOLUTION EPIDURAL; INFILTRATION; INTRACAUDAL; PERINEURAL at 19:29

## 2022-10-13 RX ADMIN — ESMOLOL HYDROCHLORIDE 10 MG: 10 INJECTION INTRAVENOUS at 19:48

## 2022-10-13 RX ADMIN — SUGAMMADEX 200 MG: 100 INJECTION, SOLUTION INTRAVENOUS at 20:24

## 2022-10-13 RX ADMIN — SODIUM CHLORIDE, POTASSIUM CHLORIDE, SODIUM LACTATE AND CALCIUM CHLORIDE 100 ML/HR: 600; 310; 30; 20 INJECTION, SOLUTION INTRAVENOUS at 02:00

## 2022-10-13 RX ADMIN — Medication 200 MCG: at 20:03

## 2022-10-13 RX ADMIN — DEXAMETHASONE SODIUM PHOSPHATE 4 MG: 4 INJECTION, SOLUTION INTRA-ARTICULAR; INTRALESIONAL; INTRAMUSCULAR; INTRAVENOUS; SOFT TISSUE at 19:30

## 2022-10-13 RX ADMIN — HYDROCODONE BITARTRATE AND ACETAMINOPHEN 1 TABLET: 10; 325 TABLET ORAL at 06:34

## 2022-10-13 RX ADMIN — Medication 100 MCG: at 20:12

## 2022-10-13 RX ADMIN — ACETAMINOPHEN 1000 MG: 500 TABLET, FILM COATED ORAL at 18:28

## 2022-10-13 RX ADMIN — ONDANSETRON 4 MG: 2 INJECTION INTRAMUSCULAR; INTRAVENOUS at 19:30

## 2022-10-13 RX ADMIN — VANCOMYCIN HYDROCHLORIDE 1000 MG: 5 INJECTION, POWDER, LYOPHILIZED, FOR SOLUTION INTRAVENOUS at 09:41

## 2022-10-13 RX ADMIN — FENTANYL CITRATE 50 MCG: 50 INJECTION, SOLUTION INTRAMUSCULAR; INTRAVENOUS at 19:44

## 2022-10-13 RX ADMIN — LIDOCAINE HYDROCHLORIDE 50 MG: 20 INJECTION, SOLUTION EPIDURAL; INFILTRATION; INTRACAUDAL; PERINEURAL at 19:23

## 2022-10-13 RX ADMIN — ESMOLOL HYDROCHLORIDE 10 MG: 10 INJECTION INTRAVENOUS at 19:54

## 2022-10-13 RX ADMIN — TAZOBACTAM SODIUM AND PIPERACILLIN SODIUM 3.38 G: 375; 3 INJECTION, SOLUTION INTRAVENOUS at 09:41

## 2022-10-13 RX ADMIN — EPHEDRINE SULFATE 10 MG: 50 INJECTION INTRAVENOUS at 19:29

## 2022-10-13 RX ADMIN — HYDROCODONE BITARTRATE AND ACETAMINOPHEN 1 TABLET: 5; 325 TABLET ORAL at 22:08

## 2022-10-13 RX ADMIN — SENNOSIDES AND DOCUSATE SODIUM 2 TABLET: 8.6; 5 TABLET ORAL at 09:41

## 2022-10-13 RX ADMIN — MORPHINE SULFATE 2 MG: 2 INJECTION, SOLUTION INTRAMUSCULAR; INTRAVENOUS at 01:23

## 2022-10-13 RX ADMIN — SODIUM CHLORIDE, POTASSIUM CHLORIDE, SODIUM LACTATE AND CALCIUM CHLORIDE 9 ML/HR: 600; 310; 30; 20 INJECTION, SOLUTION INTRAVENOUS at 17:33

## 2022-10-13 RX ADMIN — HYDROCODONE BITARTRATE AND ACETAMINOPHEN 1 TABLET: 10; 325 TABLET ORAL at 12:01

## 2022-10-13 RX ADMIN — POTASSIUM CHLORIDE 10 MEQ: 10 CAPSULE, COATED, EXTENDED RELEASE ORAL at 09:41

## 2022-10-13 RX ADMIN — TAZOBACTAM SODIUM AND PIPERACILLIN SODIUM 3.38 G: 375; 3 INJECTION, SOLUTION INTRAVENOUS at 17:01

## 2022-10-13 RX ADMIN — SODIUM CHLORIDE: 9 INJECTION, SOLUTION INTRAVENOUS at 19:44

## 2022-10-13 RX ADMIN — FENTANYL CITRATE 50 MCG: 50 INJECTION, SOLUTION INTRAMUSCULAR; INTRAVENOUS at 19:41

## 2022-10-13 RX ADMIN — ROCURONIUM BROMIDE 35 MG: 10 INJECTION INTRAVENOUS at 19:45

## 2022-10-13 RX ADMIN — SODIUM CHLORIDE 100 ML/HR: 9 INJECTION, SOLUTION INTRAVENOUS at 09:41

## 2022-10-13 RX ADMIN — MIDAZOLAM HYDROCHLORIDE 2 MG: 1 INJECTION, SOLUTION INTRAMUSCULAR; INTRAVENOUS at 18:29

## 2022-10-13 RX ADMIN — VANCOMYCIN HYDROCHLORIDE 1500 MG: 5 INJECTION, POWDER, LYOPHILIZED, FOR SOLUTION INTRAVENOUS at 17:01

## 2022-10-13 RX ADMIN — Medication 10 ML: at 09:42

## 2022-10-13 RX ADMIN — ROCURONIUM BROMIDE 5 MG: 10 INJECTION INTRAVENOUS at 19:23

## 2022-10-13 RX ADMIN — Medication 200 MCG: at 19:30

## 2022-10-13 RX ADMIN — Medication 200 MCG: at 19:49

## 2022-10-13 NOTE — PROGRESS NOTES
"[facility] - Clinical Pharmacy Department    Vancomycin Pharmacokinetic Note    Uche Pereyra is a 62 y.o. male who is on day 3 of pharmacy to dose vancomycin for Complicated skin and soft tissue infection.    Target level: AUC24 400-600  Consulting Provider: Joyce  Current Vancomycin Dose: 1000 mg IV every 12 hours  Planned Duration of Therapy: 7 days  Other Antimicrobials: Piperacillin/Tazobactam     Allergies  Allergies as of 10/11/2022    (No Known Allergies)       Microbiology:  Microbiology Results (last 10 days)       Procedure Component Value - Date/Time    MRSA Screen, PCR (Inpatient) - Swab, Nares [[id number]]  (Normal) Collected: 10/11/22 2242    Lab Status: Final result Specimen: Swab from Nares Updated: 10/12/22 0207     MRSA PCR No MRSA Detected    Narrative:      The negative predictive value of this diagnostic test is high and should only be used to consider de-escalating anti-MRSA therapy. A positive result may indicate colonization with MRSA and must be correlated clinically.    Blood Culture - Blood, Arm, Left [[id number]]  (Normal) Collected: 10/11/22 1745    Lab Status: Preliminary result Specimen: Blood from Arm, Left Updated: 10/12/22 1745     Blood Culture No growth at 24 hours    Blood Culture - Blood, Arm, Left [[id number]]  (Normal) Collected: 10/11/22 1744    Lab Status: Preliminary result Specimen: Blood from Arm, Left Updated: 10/12/22 1800     Blood Culture No growth at 24 hours    Wound Culture - Wound, Leg, Right [[id number]]  (Abnormal) Collected: 10/11/22 1720    Lab Status: Preliminary result Specimen: Wound from Leg, Right Updated: 10/13/22 1208     Wound Culture Heavy growth (4+) Mixed Gram Negative Michelle     Gram Stain Few (2+) Gram positive cocci in pairs and clusters      Few (2+) Gram negative bacilli            Vitals/Labs/I&O  Visit Vitals  BP 93/61   Pulse 112   Temp 98.5 °F (36.9 °C) (Oral)   Resp 14   Ht 177.8 cm (70\")   Wt 47 kg (103 lb 9.9 oz)   SpO2 91% "   BMI 14.87 kg/m²     Temp (24hrs), Av.8 °F (37.1 °C), Min:98.2 °F (36.8 °C), Max:100 °F (37.8 °C)      Results from last 7 days   Lab Units 10/13/22  0317 10/12/22  0315 10/11/22  1744   WBC 10*3/mm3 22.06* 20.56* 20.32*   LACTATE mmol/L 1.3  --  1.9       Results from last 7 days   Lab Units 10/13/22  0317 10/12/22  0315 10/11/22  1744   WBC 10*3/mm3 22.06* 20.56* 20.32*     Results from last 7 days   Lab Units 10/13/22  0317 10/11/22  1744   LACTATE mmol/L 1.3 1.9                       Estimated Creatinine Clearance: 139.6 mL/min (A) (by C-G formula based on SCr of 0.36 mg/dL (L)).  Results from last 7 days   Lab Units 10/13/22  0317 10/12/22  1406 10/12/22  0315   BUN mg/dL 6* 10 13   CREATININE mg/dL 0.36* 0.39* 0.41*     Intake & Output (last 3 days)         10/10 0701  10/11 0700 10/11 0701  10/12 0700 10/12 0701  10/13 0700 10/13 0701  10/14 0700    I.V. (mL/kg)  915 (19.5) 1545 (32.9)     IV Piggyback  1300 2300     Total Intake(mL/kg)  2215 (47.1) 3845 (81.8)     Urine (mL/kg/hr)  650 950 (0.8)     Total Output  650 950     Net  +1565 +2895                     Vancomycin Dosing and Level History:  Receiving Prior to Admission?: No  Is Patient on Dialysis (HD or PD) or Renal Replacement?: No    Results from last 7 days   Lab Units 10/13/22  0855   VANCOMYCIN TR mcg/mL <4.00*               Results from last 7 days   Lab Units 10/13/22  0855   VANCOMYCIN TR mcg/mL <4.00*       Assessment/Plan:  Contrast Administered?: No       Recommendations/Plan:  Increase vancomycin regimen to 1500 mg IV every 12 hours which gives the following predicted parameters based upon population pharmacokinetics and this patient's specific parameters.  AUC24,ss: 462 mg/L.hr  PAUC*: 76 %  Ctrough,ss: 9.2 mg/L  Pconc*: 1 %  Tox.: 5 %  Continue to monitor SCr    Thank you for involving pharmacy in this patient's care. Please contact pharmacy with any questions or concerns.                           Kecia Shafer RPH  Clinical  Pharmacist  10/13/22 13:56 EDT

## 2022-10-13 NOTE — PROGRESS NOTES
[facility]   Hospitalist Progress Note  Date: 10/13/2022  Patient Name: Uche Pereyra    Subjective   Subjective     Chief Complaint:   Leg pain    Summary:   62-year-old male presents to the hospital with right leg pain.  The patient is intermittently homeless usually living in a park in [place] but lives with his sister locally in the winter months.  He has been living with her since August and began having trouble with his leg several weeks ago but history is very inconsistent.  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  Severe hyponatremia, septic on presentation initiated IV antibiotics and placed in the ICU.  Vascular surgery planning for amputation.      Interval Followup: Pain well controlled today, patient is anxiously awaiting his surgery later today, no active complaints other than wanting some coffee    Review of Systems   No nausea vomiting no chest pain currently    Objective   Objective     Vitals:   Temp:  [98.2 °F (36.8 °C)-100 °F (37.8 °C)] 98.5 °F (36.9 °C)  Heart Rate:  [104-129] 104  Resp:  [14-29] 14  BP: ()/(46-98) 97/59  Flow (L/min):  [1-3] 1  Physical Exam   Gen: NAD, cachectic male sitting up in bed comfortably, severe muscle wasting  HEENT: NCAT, mmm, poor dentition  Resp: CTAB no wrr, no dyspnea  CV: RRR no mrg  GI: Abdomen soft NT, ND +bs  Psych: AOx3, normal mood and affect  MSK: Right lower extremity with extensive gangrene      Result Review    Result Review:  I have personally reviewed the results from 10/13/2022 15:41 EDT and agree with these findings:  [x]  Laboratory   Sodium 122  White count 22  Hemoglobin 9.3  [x]  Microbiology wound culture: Mixed gram-negative erma, GPC's in pairs  [x]  Radiology liver ultrasound: Normal size without focal lesion  CTA: Extensive gas within the right foot and right lower leg compatible with cellulitis and infection, complete occlusion of  bilateral external iliac common femoral and superficial femoral  [x]  EKG/Telemetry telemetry personally reviewed: Sinus tachycardia into the 120s  []  Cardiology/Vascular   []  Pathology  []  Old records  []  Other:    Assessment & Plan   Assessment / Plan     Assessment/Plan:  Sepsis due to right lower extremity gangrene  Severe gangrene of the right lower extremity  Peripheral vascular disease  Hyponatremia  Sepsis  Anemia  Elevated liver enzymes  Cachexia with weight loss     Plan:   Continue vancomycin and Zosyn  Vascular surgery planning for amputation tomorrow  IV fluids 100 cc an hour  Monitoring sodium, sodium will not improve until patient has amputation, delaying surgery due to hyponatremia will harm the patient  Monitor liver enzymes  Continue Norco for pain control in addition to IV morphine  APS involved  TSH ordered,, urine osmolality and sodium     DVT ppx: SCDs  Diet: Regular  Discussed with bedside RN and critical care

## 2022-10-13 NOTE — ANESTHESIA PREPROCEDURE EVALUATION
Anesthesia Evaluation     Patient summary reviewed and Nursing notes reviewed   NPO Solid Status: > 8 hours  NPO Liquid Status: > 8 hours           Airway   Mallampati: II  TM distance: >3 FB  No difficulty expected  Dental    (+) poor dentition    Pulmonary - normal exam    breath sounds clear to auscultation  (+) a smoker Current Abstained day of surgery, COPD moderate,   Cardiovascular   Exercise tolerance: poor (<4 METS)    NYHA Classification: III  ECG reviewed  Rhythm: regular  Rate: normal    (+) PVD,       Neuro/Psych  GI/Hepatic/Renal/Endo      Musculoskeletal     Abdominal  - normal exam    Abdomen: soft.  Bowel sounds: normal.   Substance History   (+) drug use     OB/GYN          Other                        Anesthesia Plan    ASA 4     general     (Patient has severe malnutrition, chronic hyponatremia. Is A&Ox3. Septic with gangrene to right foot/leg.)  intravenous induction     Anesthetic plan, risks, benefits, and alternatives have been provided, discussed and informed consent has been obtained with: patient.  Pre-procedure education provided  Plan discussed with CRNA.        CODE STATUS:    Code Status (Patient has no pulse and is not breathing): CPR (Attempt to Resuscitate)  Medical Interventions (Patient has pulse or is breathing): Full Support

## 2022-10-13 NOTE — PLAN OF CARE
Goal Outcome Evaluation:  Plan of Care Reviewed With: patient           Outcome Evaluation: Pt stable today, awaiting OR within the next hour.  Lupe Pathak RN

## 2022-10-13 NOTE — PROGRESS NOTES
Pulmonary / Critical Care Progress Note      Patient Name: Uche Pereyra  : 1960  MRN: [id number]  Attending:  Karlie Hernandez MD   Date of admission: 10/11/2022    Subjective   Subjective   Patient critically ill with severe sepsis, maggot infestation and gangrene right lower extremity    Over past 24 hours:  Tachycardia better but no real changes  Overall weak and fatigue  Does have low-grade fever  Wound culture with heavy growth of multiple mixed gram-negative rods  To be going today for a right AKA  Does have foul-smelling wound despite debridement at bedside  Denies any respiratory symptoms or chest pain  No nausea, fevers or chills      Review of Systems  Constitutional symptoms:   Fatigue, otherwise denied complaints   Ear, nose, throat: Denied complaints  Cardiovascular:  Denied complaints  Respiratory: Denied complaints  Gastrointestinal: Denied complaints  Musculoskeletal: Weakness, right lower extremity pain and tenderness, otherwise denied complaints  Neurologic: Denied complaints  Skin: Denied complaints        Objective   Objective     Vitals:   Vital signs for last 24 hours:  Temp:  [98.2 °F (36.8 °C)-100 °F (37.8 °C)] 100 °F (37.8 °C)  Heart Rate:  [105-129] 119  Resp:  [14-22] 19  BP: ()/(46-98) 87/59    Intake/Output last 3 shifts:  I/O last 3 completed shifts:  In: 6010 [I.V.:2460; IV Piggyback:3550]  Out: 1600 [Urine:1600]  Intake/Output this shift:  No intake/output data recorded.    Vent settings for last 24 hours:       Hemodynamic parameters for last 24 hours:       Physical Exam   Vital Signs Reviewed   General: Thin frail cachectic male, Alert, NAD.    HEENT:  PERRL, EOMI.  OP, nares clear  Neck:  Supple, no JVD, no thyromegaly  Chest:  good aeration, clear to auscultation bilaterally, tympanic to percussion bilaterally, no work of breathing noted, scaphoid chest  CV: Sinus tachycardia no MGR, pulses 2+, equal.  Abd:  Soft, NT, ND, + BS, no HSM  EXT:  no clubbing, no  cyanosis, right lower extremity has foul-smelling gangrene with gas-forming crepitus and multiple wounds that still have some maggots in them despite irrigation  Neuro:  A&Ox3, CN grossly intact, no focal deficits.  Skin: No rashes or lesions noted         Result Review    Result Review:  I have personally reviewed the results from the time of this admission to 10/13/2022 10:43 EDT and agree with these findings:  [x]  Laboratory  [x]  Microbiology  [x]  Radiology  [x]  EKG/Telemetry   []  Cardiology/Vascular   []  Pathology  []  Old records  []  Other:  Most notable findings include:  Wound culture with multiple gram-negative rods      Lab 10/13/22  0317 10/12/22  1406 10/12/22  0315 10/11/22  1943 10/11/22  1744   WBC 22.06*  --  20.56*  --  20.32*   HEMOGLOBIN 8.6*  --  9.3*  --  11.1*   HEMATOCRIT 25.8*  --  26.4*  --  32.5*   PLATELETS 546*  --  667*  --  792*   SODIUM 122* 123* 123* 121* 119*   POTASSIUM 3.9 3.4* 4.2 4.2 5.3*   CHLORIDE 90* 92* 89* 87* 83*   CO2 21.0* 22.1 21.5* 23.1 21.6*   BUN 6* 10 13 15 18   CREATININE 0.36* 0.39* 0.41* 0.49* 0.55*   GLUCOSE 94 118* 103* 104* 127*   CALCIUM 8.2* 8.1* 9.4 9.1 10.3   PHOSPHORUS  --  2.7  --  3.0  --    TOTAL PROTEIN 6.4  --   --   --  8.4   ALBUMIN 2.30* 2.40*  --   --  3.20*   GLOBULIN 4.1  --   --   --  5.2         Assessment & Plan   Assessment / Plan     Active Hospital Problems:  Active Hospital Problems    Diagnosis    • **Sepsis (HCC)    • Severe malnutrition (HCC)    • Gas gangrene (HCC)    • 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 (HCC)      Added automatically from request for surgery [id number]           Impression:  Gas gangrene of right lower extremity from gram-positive and gram-negative organisms  Severe sepsis  Arterial occlusion of right lower extremity, chronic  Maggot infestation of gas gangrene right lower extremity  Hyponatremia  Hypokalemia  Hyperglycemia  Anemia of chronic  disease  Leukocytosis  Transaminitis  Severe protein calorie malnutrition with muscle wasting, poor oral intake and cachexia  Ongoing tobacco use of cigarettes     Plan:  This patient has arterial occlusion to the right lower extremity and has gas gangrene with maggot infestation of the right lower extremity and severe sepsis.   Patient is to go today for a right AKA with vascular surgery  Change IV fluids normal saline at 100 cc/h  Trend renal function and electrolytes.  Sodium slowly improving.  Replace potassium orally on day 2 of vancomycin and Zosyn.  Will de-escalate based off cultures  Will need diet after surgery as well as supplements per dietitian.  Will need to monitor renal function and electrolytes closely for refeeding syndrome  Wound care and images reviewed.  Pain control adequate at this time with current nonnarcotic and narcotic analgesia     DVT prophylaxis:  Mechanical DVT prophylaxis orders are present.    CODE STATUS:   Code Status (Patient has no pulse and is not breathing): CPR (Attempt to Resuscitate)  Medical Interventions (Patient has pulse or is breathing): Full Support      The patient is critically ill in the ICU with severe sepsis, right lower extremity gas gangrene, multiple electrolyte disturbances. Multidisciplinary bedside critical care rounds were performed with nursing staff, respiratory therapy, pharmacy, nutritional services, social work. I have personally reviewed the chart, labs and any pertinent imaging available.  I have spent 31 minutes of critical care time, excluding procedures, in the care of this patient.    Electronically signed by Boogie Eller MD, 10/13/22, 10:45 AM EDT.

## 2022-10-14 LAB
ALBUMIN SERPL-MCNC: 1.8 G/DL (ref 3.5–5.2)
ALBUMIN SERPL-MCNC: 1.9 G/DL (ref 3.5–5.2)
ALBUMIN/GLOB SERPL: 0.5 G/DL
ALP SERPL-CCNC: 166 U/L (ref 39–117)
ALT SERPL W P-5'-P-CCNC: 35 U/L (ref 1–41)
ANION GAP SERPL CALCULATED.3IONS-SCNC: 8.2 MMOL/L (ref 5–15)
ANION GAP SERPL CALCULATED.3IONS-SCNC: 8.4 MMOL/L (ref 5–15)
AST SERPL-CCNC: 57 U/L (ref 1–40)
BACTERIA SPEC AEROBE CULT: ABNORMAL
BASOPHILS # BLD AUTO: 0.02 10*3/MM3 (ref 0–0.2)
BASOPHILS NFR BLD AUTO: 0.1 % (ref 0–1.5)
BILIRUB SERPL-MCNC: 0.3 MG/DL (ref 0–1.2)
BUN SERPL-MCNC: 8 MG/DL (ref 8–23)
BUN SERPL-MCNC: 8 MG/DL (ref 8–23)
BUN/CREAT SERPL: 23.5 (ref 7–25)
BUN/CREAT SERPL: 26.7 (ref 7–25)
CALCIUM SPEC-SCNC: 8.1 MG/DL (ref 8.6–10.5)
CALCIUM SPEC-SCNC: 8.4 MG/DL (ref 8.6–10.5)
CHLORIDE SERPL-SCNC: 91 MMOL/L (ref 98–107)
CHLORIDE SERPL-SCNC: 92 MMOL/L (ref 98–107)
CO2 SERPL-SCNC: 23.8 MMOL/L (ref 22–29)
CO2 SERPL-SCNC: 24.6 MMOL/L (ref 22–29)
CREAT SERPL-MCNC: 0.3 MG/DL (ref 0.76–1.27)
CREAT SERPL-MCNC: 0.34 MG/DL (ref 0.76–1.27)
DEPRECATED RDW RBC AUTO: 51.1 FL (ref 37–54)
EGFRCR SERPLBLD CKD-EPI 2021: 128.8 ML/MIN/1.73
EGFRCR SERPLBLD CKD-EPI 2021: 133.7 ML/MIN/1.73
EOSINOPHIL # BLD AUTO: 0.01 10*3/MM3 (ref 0–0.4)
EOSINOPHIL NFR BLD AUTO: 0.1 % (ref 0.3–6.2)
ERYTHROCYTE [DISTWIDTH] IN BLOOD BY AUTOMATED COUNT: 15.7 % (ref 12.3–15.4)
GLOBULIN UR ELPH-MCNC: 3.6 GM/DL
GLUCOSE SERPL-MCNC: 146 MG/DL (ref 65–99)
GLUCOSE SERPL-MCNC: 150 MG/DL (ref 65–99)
GRAM STN SPEC: ABNORMAL
GRAM STN SPEC: ABNORMAL
HCT VFR BLD AUTO: 23 % (ref 37.5–51)
HGB BLD-MCNC: 7.7 G/DL (ref 13–17.7)
IMM GRANULOCYTES # BLD AUTO: 0.28 10*3/MM3 (ref 0–0.05)
IMM GRANULOCYTES NFR BLD AUTO: 1.6 % (ref 0–0.5)
LYMPHOCYTES # BLD AUTO: 1.09 10*3/MM3 (ref 0.7–3.1)
LYMPHOCYTES NFR BLD AUTO: 6.3 % (ref 19.6–45.3)
MAGNESIUM SERPL-MCNC: 1.7 MG/DL (ref 1.6–2.4)
MAGNESIUM SERPL-MCNC: 2.1 MG/DL (ref 1.6–2.4)
MCH RBC QN AUTO: 30 PG (ref 26.6–33)
MCHC RBC AUTO-ENTMCNC: 33.5 G/DL (ref 31.5–35.7)
MCV RBC AUTO: 89.5 FL (ref 79–97)
MONOCYTES # BLD AUTO: 0.56 10*3/MM3 (ref 0.1–0.9)
MONOCYTES NFR BLD AUTO: 3.2 % (ref 5–12)
NEUTROPHILS NFR BLD AUTO: 15.35 10*3/MM3 (ref 1.7–7)
NEUTROPHILS NFR BLD AUTO: 88.7 % (ref 42.7–76)
NRBC BLD AUTO-RTO: 0 /100 WBC (ref 0–0.2)
OSMOLALITY UR: 489 MOSM/KG
PHOSPHATE SERPL-MCNC: 1.9 MG/DL (ref 2.5–4.5)
PHOSPHATE SERPL-MCNC: 3.1 MG/DL (ref 2.5–4.5)
PLATELET # BLD AUTO: 473 10*3/MM3 (ref 140–450)
PMV BLD AUTO: 8.1 FL (ref 6–12)
POTASSIUM SERPL-SCNC: 3.5 MMOL/L (ref 3.5–5.2)
POTASSIUM SERPL-SCNC: 3.9 MMOL/L (ref 3.5–5.2)
PROT SERPL-MCNC: 5.4 G/DL (ref 6–8.5)
RBC # BLD AUTO: 2.57 10*6/MM3 (ref 4.14–5.8)
SODIUM SERPL-SCNC: 123 MMOL/L (ref 136–145)
SODIUM SERPL-SCNC: 125 MMOL/L (ref 136–145)
SODIUM UR-SCNC: 47 MMOL/L
TSH SERPL DL<=0.05 MIU/L-ACNC: 2.91 UIU/ML (ref 0.27–4.2)
WBC NRBC COR # BLD: 17.31 10*3/MM3 (ref 3.4–10.8)

## 2022-10-14 PROCEDURE — 84100 ASSAY OF PHOSPHORUS: CPT | Performed by: SURGERY

## 2022-10-14 PROCEDURE — 80053 COMPREHEN METABOLIC PANEL: CPT | Performed by: SURGERY

## 2022-10-14 PROCEDURE — 25010000002 HYDROMORPHONE 1 MG/ML SOLUTION: Performed by: SURGERY

## 2022-10-14 PROCEDURE — 99233 SBSQ HOSP IP/OBS HIGH 50: CPT | Performed by: INTERNAL MEDICINE

## 2022-10-14 PROCEDURE — 25010000002 VANCOMYCIN 5 G RECONSTITUTED SOLUTION: Performed by: SURGERY

## 2022-10-14 PROCEDURE — 25010000002 PIPERACILLIN SOD-TAZOBACTAM PER 1 G: Performed by: SURGERY

## 2022-10-14 PROCEDURE — 99024 POSTOP FOLLOW-UP VISIT: CPT | Performed by: SURGERY

## 2022-10-14 PROCEDURE — 84100 ASSAY OF PHOSPHORUS: CPT | Performed by: PHYSICIAN ASSISTANT

## 2022-10-14 PROCEDURE — 83735 ASSAY OF MAGNESIUM: CPT | Performed by: SURGERY

## 2022-10-14 PROCEDURE — 84443 ASSAY THYROID STIM HORMONE: CPT | Performed by: SURGERY

## 2022-10-14 PROCEDURE — 83735 ASSAY OF MAGNESIUM: CPT | Performed by: INTERNAL MEDICINE

## 2022-10-14 PROCEDURE — 25010000002 MAGNESIUM SULFATE IN D5W 1G/100ML (PREMIX) 1-5 GM/100ML-% SOLUTION: Performed by: INTERNAL MEDICINE

## 2022-10-14 PROCEDURE — 99291 CRITICAL CARE FIRST HOUR: CPT | Performed by: INTERNAL MEDICINE

## 2022-10-14 PROCEDURE — 85025 COMPLETE CBC W/AUTO DIFF WBC: CPT | Performed by: SURGERY

## 2022-10-14 RX ORDER — FENTANYL/ROPIVACAINE/NS/PF 2-625MCG/1
15 PLASTIC BAG, INJECTION (ML) EPIDURAL ONCE
Status: COMPLETED | OUTPATIENT
Start: 2022-10-14 | End: 2022-10-14

## 2022-10-14 RX ORDER — MAGNESIUM SULFATE 1 G/100ML
1 INJECTION INTRAVENOUS
Status: DISPENSED | OUTPATIENT
Start: 2022-10-14 | End: 2022-10-14

## 2022-10-14 RX ORDER — POTASSIUM CHLORIDE 1.5 G/1.77G
40 POWDER, FOR SOLUTION ORAL ONCE
Status: COMPLETED | OUTPATIENT
Start: 2022-10-14 | End: 2022-10-14

## 2022-10-14 RX ADMIN — TAZOBACTAM SODIUM AND PIPERACILLIN SODIUM 3.38 G: 375; 3 INJECTION, SOLUTION INTRAVENOUS at 10:17

## 2022-10-14 RX ADMIN — POTASSIUM PHOSPHATE, MONOBASIC AND POTASSIUM PHOSPHATE, DIBASIC 15 MMOL: 224; 236 INJECTION, SOLUTION, CONCENTRATE INTRAVENOUS at 16:44

## 2022-10-14 RX ADMIN — MAGNESIUM SULFATE 1 G: 1 INJECTION INTRAVENOUS at 06:49

## 2022-10-14 RX ADMIN — HYDROMORPHONE HYDROCHLORIDE 0.25 MG: 1 INJECTION, SOLUTION INTRAMUSCULAR; INTRAVENOUS; SUBCUTANEOUS at 11:03

## 2022-10-14 RX ADMIN — HYDROMORPHONE HYDROCHLORIDE 0.25 MG: 1 INJECTION, SOLUTION INTRAMUSCULAR; INTRAVENOUS; SUBCUTANEOUS at 00:37

## 2022-10-14 RX ADMIN — TAZOBACTAM SODIUM AND PIPERACILLIN SODIUM 3.38 G: 375; 3 INJECTION, SOLUTION INTRAVENOUS at 02:30

## 2022-10-14 RX ADMIN — POTASSIUM CHLORIDE 40 MEQ: 1.5 POWDER, FOR SOLUTION ORAL at 07:03

## 2022-10-14 RX ADMIN — HYDROCODONE BITARTRATE AND ACETAMINOPHEN 1 TABLET: 10; 325 TABLET ORAL at 13:18

## 2022-10-14 RX ADMIN — TAZOBACTAM SODIUM AND PIPERACILLIN SODIUM 3.38 G: 375; 3 INJECTION, SOLUTION INTRAVENOUS at 19:44

## 2022-10-14 RX ADMIN — VANCOMYCIN HYDROCHLORIDE 1500 MG: 5 INJECTION, POWDER, LYOPHILIZED, FOR SOLUTION INTRAVENOUS at 06:58

## 2022-10-14 RX ADMIN — MAGNESIUM SULFATE 1 G: 1 INJECTION INTRAVENOUS at 10:03

## 2022-10-14 RX ADMIN — SENNOSIDES AND DOCUSATE SODIUM 2 TABLET: 8.6; 5 TABLET ORAL at 21:25

## 2022-10-14 RX ADMIN — Medication 10 ML: at 21:26

## 2022-10-14 RX ADMIN — HYDROCODONE BITARTRATE AND ACETAMINOPHEN 1 TABLET: 10; 325 TABLET ORAL at 06:46

## 2022-10-14 RX ADMIN — HYDROCODONE BITARTRATE AND ACETAMINOPHEN 1 TABLET: 10; 325 TABLET ORAL at 21:26

## 2022-10-14 RX ADMIN — Medication 10 ML: at 10:08

## 2022-10-14 NOTE — PROGRESS NOTES
[facility]     Progress Note    Patient Name: Uche Pereyra  : 1960  MRN: [id number]  Primary Care Physician:  Provider, No Known  Date of admission: 10/11/2022    Subjective   Subjective     POD #1 status post right AKA    Doing well.  Having some pain at the surgical site.  No other complaints.    Objective   Objective     Vitals:   Temp:  [97.5 °F (36.4 °C)-97.9 °F (36.6 °C)] 97.5 °F (36.4 °C)  Heart Rate:  [] 99  Resp:  [14-21] 16  BP: ()/(48-71) 98/56  Flow (L/min):  [1-2] 1  FiO2 (%):  [0.6 %-1 %] 1 %    Physical Exam   General: Alert, no acute distress  Right AKA: Dressing clean, dry, intact.    Hgb: 7.7    Assessment & Plan   Assessment / Plan     Assessment/Plan:    Satisfactory progress following right AKA.  Will examine wound on 10/17/2022.  No vascular surgery coverage over the weekend, however, should there be any questions or concerns please feel free to contact me.    Active Hospital Problems:  Active Hospital Problems    Diagnosis    • **Sepsis (HCC)    • Severe malnutrition (HCC)    • Gas gangrene (HCC)    • 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 (HCC)      Added automatically from request for surgery [id number]             Electronically signed by Ghulam Whittington MD, 10/14/22, 6:39 PM EDT.

## 2022-10-14 NOTE — ANESTHESIA POSTPROCEDURE EVALUATION
Patient: Uche Pereyra    Procedure Summary     Date: 10/13/22 Room / Location: [facility] OR 01 / [facility] MAIN OR    Anesthesia Start: 1916 Anesthesia Stop: 2044    Procedure: Right above-the-knee amputation (Right: Thigh) Diagnosis:       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 (HCC)      (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 (HCC) [I70.261])    Surgeons: Ghulam Whittington MD Provider: Boogie Lawson MD    Anesthesia Type: general ASA Status: 4          Anesthesia Type: general    Vitals  Vitals Value Taken Time   BP 88/52 10/13/22 2046   Temp     Pulse 112 10/13/22 2047   Resp     SpO2     Vitals shown include unvalidated device data.        Post Anesthesia Care and Evaluation    Patient location during evaluation: bedside  Patient participation: complete - patient participated  Level of consciousness: awake and alert  Pain management: adequate    Airway patency: patent  Anesthetic complications: No anesthetic complications  PONV Status: none  Cardiovascular status: acceptable  Respiratory status: acceptable  Hydration status: acceptable    Comments: An Anesthesiologist personally participated in the most demanding procedures (including induction and emergence if applicable) in the anesthesia plan, monitored the course of anesthesia administration at frequent intervals and remained physically present and available for immediate diagnosis and treatment of emergencies.

## 2022-10-14 NOTE — PROGRESS NOTES
[facility]   Hospitalist Progress Note  Date: 10/14/2022  Patient Name: Uche Pereyra    Subjective   Subjective     Chief Complaint:   Leg pain    Summary:   62-year-old male presents to the hospital with right leg pain.  The patient is intermittently homeless usually living in a park in [place] but lives with his sister locally in the winter months.  He has been living with her since August and began having trouble with his leg several weeks ago but history is very inconsistent.  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  Severe hyponatremia, septic on presentation initiated IV antibiotics and placed in the ICU.  Underwent AKA on October 13    Interval Followup: Patient tolerated surgery well, states that his pain is greatly improved postoperatively.  Is very happy today, states that his appetite has returned and that he is out of pain for the first time in several months.    Review of Systems   No nausea vomiting no chest pain currently    Objective   Objective     Vitals:   Temp:  [97.5 °F (36.4 °C)-98.4 °F (36.9 °C)] 97.6 °F (36.4 °C)  Heart Rate:  [] 89  Resp:  [14-21] 19  BP: ()/(48-71) 94/60  Flow (L/min):  [1-2] 1  FiO2 (%):  [0.6 %-1 %] 1 %  Physical Exam   Gen: NAD, cachectic male sitting up in bed comfortably, severe muscle wasting  HEENT: NCAT, mmm, poor dentition  Resp: CTAB no wrr, no dyspnea  CV: RRR no mrg  GI: Abdomen soft NT, ND +bs  Psych: AOx3, normal mood and affect  MSK: Right AKA bandages and Ace wrap in place      Result Review    Result Review:  I have personally reviewed the results from 10/14/2022 13:25 EDT and agree with these findings:  [x]  Laboratory   Sodium 122 now 125  White count 22 now 17  Hemoglobin 9.3 now 7.7  [x]  Microbiology wound culture: Mixed gram-negative's  [x]  Radiology   [x]  EKG/Telemetry telemetry personally reviewed: Sinus rhythm in the 80s without acute  events  []  Cardiology/Vascular   []  Pathology  []  Old records  []  Other:    Assessment & Plan   Assessment / Plan     Assessment/Plan:  Sepsis due to right lower extremity gangrene  Severe gangrene of the right lower extremity  Peripheral vascular disease  Hyponatremia  Sepsis  Anemia  Elevated liver enzymes  Cachexia with weight loss     Plan:   Discontinue vancomycin now, discontinue Zosyn tomorrow  Discontinue IV fluids  Sodium improving, expect sodium to continue to improve now that is source of sepsis has been removed  Monitor liver enzymes  Continue Norco for pain control, discontinue IV morphine and Dilaudid  APS involved  Transfer out of ICU     DVT ppx: SCDs  Diet: Regular  Discussed with bedside RN and critical care

## 2022-10-14 NOTE — CONSULTS
"Nutrition Services    Patient Name: Uche Pereyra  YOB: 1960  MRN: [id number]  Admission date: 10/11/2022      CLINICAL NUTRITION ASSESSMENT      Reason for Assessment  Identified at risk by screening criteria, BMI, Malnutrition Severity Assessment, Physician consult     H&P:    History reviewed. No pertinent past medical history.     Current Problems:   Active Hospital Problems    Diagnosis    • **Sepsis (HCC)    • Severe malnutrition (HCC)    • Gas gangrene (HCC)    • 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 (HCC)      Added automatically from request for surgery [id number]          Nutrition/Diet History         Narrative     Patient admitted with foot infection.  BMI 14.9 on admission.  Patient is unable to quantify weight history, but thinks he has lost at least 10 lbs in the past month.  Patient has obvious severe total body muscle wasting and fat loss as noted below.     No longer NPO, on regular diet with good PO intake per nursing. Discussed in multidisciplinary rounds.  Plan to start oral nutrition supplements. Patient is receptive to strawberry or vanilla Ensure with meals. States he has eaten \"basically nothing\" for 2 weeks.   Patient is at very high risk for developing refeeding syndrome.  Plan to watch electrolytes closely.      Anthropometrics        Current Height, Weight Height: 177.8 cm (70\")  Weight: 47 kg (103 lb 9.9 oz)   Current BMI Body mass index is 14.87 kg/m².       Weight Hx  Wt Readings from Last 30 Encounters:   10/11/22 2147 47 kg (103 lb 9.9 oz)   10/11/22 1703 46.4 kg (102 lb 4.7 oz)            Wt Change Observation -8.8% x 1 month per patient report     Estimated/Assessed Needs       Energy Requirements 30 kcal/kg adj BW   EST Needs (kcal/day) 1605 kcal        Protein Requirements 1.0-1.2 g/kg adj BW   EST Daily Needs (g/day) 54-64       Fluid Requirements 30 ml/kg adj BW    Estimated Needs (mL/day) 1605 ml fluid      Labs/Medications     "     Pertinent Labs Reviewed.   Results from last 7 days   Lab Units 10/14/22  0321 10/13/22  0317 10/12/22  1406 10/11/22  1943 10/11/22  1744   SODIUM mmol/L 125* 122* 123*   < > 119*   POTASSIUM mmol/L 3.5 3.9 3.4*   < > 5.3*   CHLORIDE mmol/L 92* 90* 92*   < > 83*   CO2 mmol/L 24.6 21.0* 22.1   < > 21.6*   BUN mg/dL 8 6* 10   < > 18   CREATININE mg/dL 0.34* 0.36* 0.39*   < > 0.55*   CALCIUM mg/dL 8.1* 8.2* 8.1*   < > 10.3   BILIRUBIN mg/dL 0.3 0.4  --   --  0.4   ALK PHOS U/L 166* 232*  --   --  348*   ALT (SGPT) U/L 35 49*  --   --  67*   AST (SGOT) U/L 57* 84*  --   --  42*   GLUCOSE mg/dL 146* 94 118*   < > 127*    < > = values in this interval not displayed.     Results from last 7 days   Lab Units 10/14/22  0321 10/13/22  0317 10/12/22  1406 10/12/22  0315   MAGNESIUM mg/dL 1.7 2.0 1.5*  --    PHOSPHORUS mg/dL 3.1  --  2.7  --    HEMOGLOBIN g/dL 7.7* 8.6*  --  9.3*   HEMATOCRIT % 23.0* 25.8*  --  26.4*   TRIGLYCERIDES mg/dL  --   --   --  84     No results found for: COVID19  Lab Results   Component Value Date    HGBA1C 7.20 (H) 10/11/2022         Pertinent Medications Reviewed.     Current Nutrition Orders & Evaluation of Intake       Oral Nutrition     Current PO Diet Diet Regular   Supplement No active supplement orders       Malnutrition Severity Assessment      Patient meets criteria for : Severe Malnutrition         Nutrition Diagnosis         Nutrition Dx Problem 1 Severe malnutrition related to inadequate energy Intake as evidenced by inadequate energy intake., unintended wt change. and body composition changes.       Nutrition Intervention         Ensure Enlive TID (+1050 kcal, 60 g protein)     Medical Nutrition Therapy/Nutrition Education          Learner     Readiness Patient  Acceptance     Method     Response Explanation  Verbalizes understanding     Monitor/Evaluation        Monitor Per protocol, PO intake, Supplement intake, Pertinent labs, Weight, RFS       Nutrition Discharge Plan          To be determined       Electronically signed by:  Tavon Zayas RD  10/14/22 08:21 EDT

## 2022-10-14 NOTE — OP NOTE
AMPUTATION ABOVE KNEE  Procedure Report    Patient Name:  Uche Pereyra  YOB: 1960    Date of Surgery:  10/13/2022     Indications:  Gangrene of the right lower leg    Pre-op Diagnosis:   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 (HCC) [I70.261]       Post-Op Diagnosis Codes:     * 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 (HCC) [I70.261]    Procedure(s):  Right above-the-knee amputation    Staff:  Surgeon(s):  Ghulam Whittington MD    Assistant: Chari Delatorre    Anesthesia: General    Estimated Blood Loss: 200 mL    Implants:    Nothing was implanted during the procedure    Specimen: Right above-the-knee amputation       Findings: Tissues viable at the level of amputation    Complications: None    Description of Procedure: The patient was brought into the operating room and was placed in the supine position.  He was then induced into general anesthesia.  The right lower extremity was then prepped and draped in the usual aseptic fashion from the knee to the level of the groin.  A stockinette was applied to the leg from the foot all the way to the knee.  A timeout was taken to confirm patient, procedure and laterality.  Standard skin markings were then made in a fish-mouth fashion.  The anterior portion of the wound was then created using a scalpel.  The subcutaneous tissue was traversed using electrocautery.  The anterior compartment muscles were also traversed using electrocautery.  The anterior two thirds of the femur were then exposed.  A periosteal elevator was used to remove the tissues of the femur for a segment of approximately 5 to 7 cm proximal to the wound.  A laparotomy pad was passed posterior to the femur to protect the soft tissues.  Large rakes were used to protect the soft tissues.  Using a power saw the femur was traversed in the distal 1/3 of the leg.  The laparotomy pad was removed.  The superficial femoral artery bundle  was identified, double clamped and transected.  The distal end was ligated using a 2-0 silk tie.  The proximal and was ligated using 2 separate 2-0 silk stick ties.  The profunda was identified in the lateral aspect of the soft tissues and also double clamped and transected and subsequently ligated.  The distal end ligated with a 2-0 silk tie and the proximal end ligated using 2 separate 2-0 silk stick ties.  Electrocautery was then continued to be used to traverse the reminder of the posterior soft tissues.  The sciatic nerve was identified, double clamped, transected and the proximal end ligated.  The edges of the cut femur were smoothed with using a rasp.  Hemostasis was assured.  The wound was then irrigated with saline.  The tissues were then approximated using multiple single interrupted 2-0 Vicryl stitches for approximation of the fascial layers of the anterior and posterior flaps.  The wound was then again irrigated with saline and this was followed by surgical staples.  A dressing was then applied in the form of an Aquacel Ag.  The patient tolerated the procedure well.    Assistant: Chari Delatorre  was responsible for performing the following activities: First assist and their skilled assistance was necessary for the success of this case.    Ghulam Whittington MD     Date: 10/13/2022  Time: 20:35 EDT

## 2022-10-14 NOTE — PROGRESS NOTES
Pulmonary / Critical Care Progress Note      Patient Name: Uche Pereyra  : 1960  MRN: [id number]  Attending:  Karlie Hernandez MD   Date of admission: 10/11/2022    Subjective   Subjective   Patient critically ill with severe sepsis, maggot infestation and gangrene right lower extremity    Over past 24 hours:  Underwent right AKA yesterday with primary closure  More awake this morning and states overall he feels better and actually has an appetite  And is not on any pressors or drips.  Only on IV fluids  Surgical site pain is a 5 out of 10 and overall doing well  No recent fevers  Wound culture with mixed gram-negative erma but blood cultures negative  Less weak and fatigued  Denies any respiratory symptoms or chest pain  No nausea, fevers or chills    Review of Systems  Constitutional symptoms:   Fatigue, otherwise denied complaints   Ear, nose, throat: Denied complaints  Cardiovascular:  Denied complaints  Respiratory: Denied complaints  Gastrointestinal: Denied complaints  Musculoskeletal: Weakness, surgical site pain right lower extremity, otherwise denied complaints  Neurologic: Denied complaints  Skin: Denied complaints        Objective   Objective     Vitals:   Vital signs for last 24 hours:  Temp:  [97.5 °F (36.4 °C)-100 °F (37.8 °C)] 97.5 °F (36.4 °C)  Heart Rate:  [] 100  Resp:  [14-29] 19  BP: ()/(48-98) 116/71  FiO2 (%):  [0.6 %-1 %] 1 %    Intake/Output last 3 shifts:  I/O last 3 completed shifts:  In: 4305 [I.V.:4305]  Out: 2000 [Urine:1800; Blood:200]  Intake/Output this shift:  No intake/output data recorded.    Vent settings for last 24 hours:  FiO2 (%):  [0.6 %-1 %] 1 %  S RR:  [8-10] 8    Hemodynamic parameters for last 24 hours:       Physical Exam   Vital Signs Reviewed   General: Thin frail cachectic male, Alert, NAD.    HEENT:  PERRL, EOMI.  OP, nares clear  Neck:  Supple, no JVD, no thyromegaly  Chest:  good aeration, clear to auscultation bilaterally, tympanic to  percussion bilaterally, no work of breathing noted, scaphoid chest  CV: rrr, no MGR, pulses 2+, equal.  Abd:  Soft, NT, ND, + BS, no HSM  EXT:  no clubbing, no cyanosis,  right AKA stump noted and closed.  Incision is clean and intact  Neuro:  A&Ox3, CN grossly intact, no focal deficits.  Skin: No rashes or lesions noted         Result Review    Result Review:  I have personally reviewed the results from the time of this admission to 10/14/2022 07:09 EDT and agree with these findings:  [x]  Laboratory  [x]  Microbiology  [x]  Radiology  [x]  EKG/Telemetry   []  Cardiology/Vascular   []  Pathology  []  Old records  []  Other:  Most notable findings include:  Wound culture with multiple gram-negative mixed erma      Lab 10/14/22  0321 10/13/22  0317 10/12/22  1406 10/12/22  0315 10/11/22  1943 10/11/22  1744   WBC 17.31* 22.06*  --  20.56*  --  20.32*   HEMOGLOBIN 7.7* 8.6*  --  9.3*  --  11.1*   HEMATOCRIT 23.0* 25.8*  --  26.4*  --  32.5*   PLATELETS 473* 546*  --  667*  --  792*   SODIUM 125* 122* 123* 123* 121* 119*   POTASSIUM 3.5 3.9 3.4* 4.2 4.2 5.3*   CHLORIDE 92* 90* 92* 89* 87* 83*   CO2 24.6 21.0* 22.1 21.5* 23.1 21.6*   BUN 8 6* 10 13 15 18   CREATININE 0.34* 0.36* 0.39* 0.41* 0.49* 0.55*   GLUCOSE 146* 94 118* 103* 104* 127*   CALCIUM 8.1* 8.2* 8.1* 9.4 9.1 10.3   PHOSPHORUS  --   --  2.7  --  3.0  --    TOTAL PROTEIN 5.4* 6.4  --   --   --  8.4   ALBUMIN 1.80* 2.30* 2.40*  --   --  3.20*   GLOBULIN 3.6 4.1  --   --   --  5.2         Assessment & Plan   Assessment / Plan     Active Hospital Problems:  Active Hospital Problems    Diagnosis    • **Sepsis (HCC)    • Severe malnutrition (HCC)    • Gas gangrene (HCC)    • 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 (HCC)      Added automatically from request for surgery [id number]           Impression:  Gas gangrene of right lower extremity from gram-positive and gram-negative organisms  Severe sepsis  Arterial occlusion of right  lower extremity, chronic  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   Status post right AKA with primary closure.  Pain control adequate.  Wound care per vascular surgery and wound care  Will discuss with hospitalist about stopping antibiotics since patient now has source control via amputation.  Clinically he has improved and blood cultures are negative.  DC IV fluids  Trend renal function and electrolytes.  Sodium slowly improving.  Replace potassium orally and magnesium IV  Regular diet as tolerated.  Supplements per dietitian.  We will monitor renal function and electrolytes closely over the next couple of days for possible refeeding syndrome  Smoking cessation encouraged.    DVT prophylaxis:  Mechanical DVT prophylaxis orders are present.    CODE STATUS:   Code Status (Patient has no pulse and is not breathing): CPR (Attempt to Resuscitate)  Medical Interventions (Patient has pulse or is breathing): Full Support    Stable to transfer out of ICU from my perspective.  Discussed with hospitalist.      The patient is critically ill in the ICU with severe sepsis, right lower extremity gas gangrene, multiple electrolyte disturbances. Multidisciplinary bedside critical care rounds were performed with nursing staff, respiratory therapy, pharmacy, nutritional services, social work. I have personally reviewed the chart, labs and any pertinent imaging available.  I have spent 30 minutes of critical care time, excluding procedures, in the care of this patient.    Electronically signed by Boogie Eller MD,  10/14/22, 8:22 AM EDT.

## 2022-10-15 LAB
ALBUMIN SERPL-MCNC: 2.1 G/DL (ref 3.5–5.2)
ALBUMIN/GLOB SERPL: 0.5 G/DL
ALP SERPL-CCNC: 237 U/L (ref 39–117)
ALT SERPL W P-5'-P-CCNC: 110 U/L (ref 1–41)
ANION GAP SERPL CALCULATED.3IONS-SCNC: 11.2 MMOL/L (ref 5–15)
AST SERPL-CCNC: 131 U/L (ref 1–40)
BASOPHILS # BLD AUTO: 0.01 10*3/MM3 (ref 0–0.2)
BASOPHILS NFR BLD AUTO: 0.1 % (ref 0–1.5)
BILIRUB SERPL-MCNC: 0.4 MG/DL (ref 0–1.2)
BUN SERPL-MCNC: 6 MG/DL (ref 8–23)
BUN/CREAT SERPL: 15.4 (ref 7–25)
CALCIUM SPEC-SCNC: 8.2 MG/DL (ref 8.6–10.5)
CHLORIDE SERPL-SCNC: 91 MMOL/L (ref 98–107)
CO2 SERPL-SCNC: 23.8 MMOL/L (ref 22–29)
CREAT SERPL-MCNC: 0.39 MG/DL (ref 0.76–1.27)
DEPRECATED RDW RBC AUTO: 50.6 FL (ref 37–54)
EGFRCR SERPLBLD CKD-EPI 2021: 123.5 ML/MIN/1.73
EOSINOPHIL # BLD AUTO: 0.03 10*3/MM3 (ref 0–0.4)
EOSINOPHIL NFR BLD AUTO: 0.2 % (ref 0.3–6.2)
ERYTHROCYTE [DISTWIDTH] IN BLOOD BY AUTOMATED COUNT: 15.6 % (ref 12.3–15.4)
GLOBULIN UR ELPH-MCNC: 3.9 GM/DL
GLUCOSE SERPL-MCNC: 114 MG/DL (ref 65–99)
HCT VFR BLD AUTO: 24.5 % (ref 37.5–51)
HCV RNA SERPL NAA+PROBE-ACNC: NORMAL IU/ML
HGB BLD-MCNC: 8.6 G/DL (ref 13–17.7)
IMM GRANULOCYTES # BLD AUTO: 0.17 10*3/MM3 (ref 0–0.05)
IMM GRANULOCYTES NFR BLD AUTO: 1.4 % (ref 0–0.5)
LYMPHOCYTES # BLD AUTO: 1.78 10*3/MM3 (ref 0.7–3.1)
LYMPHOCYTES NFR BLD AUTO: 14.2 % (ref 19.6–45.3)
MAGNESIUM SERPL-MCNC: 1.8 MG/DL (ref 1.6–2.4)
MCH RBC QN AUTO: 31 PG (ref 26.6–33)
MCHC RBC AUTO-ENTMCNC: 35.1 G/DL (ref 31.5–35.7)
MCV RBC AUTO: 88.4 FL (ref 79–97)
MONOCYTES # BLD AUTO: 1.31 10*3/MM3 (ref 0.1–0.9)
MONOCYTES NFR BLD AUTO: 10.4 % (ref 5–12)
NEUTROPHILS NFR BLD AUTO: 73.7 % (ref 42.7–76)
NEUTROPHILS NFR BLD AUTO: 9.24 10*3/MM3 (ref 1.7–7)
NRBC BLD AUTO-RTO: 0 /100 WBC (ref 0–0.2)
PHOSPHATE SERPL-MCNC: 2.3 MG/DL (ref 2.5–4.5)
PLATELET # BLD AUTO: 609 10*3/MM3 (ref 140–450)
PMV BLD AUTO: 8.1 FL (ref 6–12)
POTASSIUM SERPL-SCNC: 3.8 MMOL/L (ref 3.5–5.2)
PROT SERPL-MCNC: 6 G/DL (ref 6–8.5)
RBC # BLD AUTO: 2.77 10*6/MM3 (ref 4.14–5.8)
SODIUM SERPL-SCNC: 126 MMOL/L (ref 136–145)
TEST INFORMATION: NORMAL
WBC NRBC COR # BLD: 12.54 10*3/MM3 (ref 3.4–10.8)

## 2022-10-15 PROCEDURE — 25010000002 MORPHINE PER 10 MG: Performed by: INTERNAL MEDICINE

## 2022-10-15 PROCEDURE — 84100 ASSAY OF PHOSPHORUS: CPT | Performed by: INTERNAL MEDICINE

## 2022-10-15 PROCEDURE — 99233 SBSQ HOSP IP/OBS HIGH 50: CPT | Performed by: INTERNAL MEDICINE

## 2022-10-15 PROCEDURE — 83735 ASSAY OF MAGNESIUM: CPT | Performed by: INTERNAL MEDICINE

## 2022-10-15 PROCEDURE — 25010000002 MAGNESIUM SULFATE 2 GM/50ML SOLUTION: Performed by: INTERNAL MEDICINE

## 2022-10-15 PROCEDURE — 85025 COMPLETE CBC W/AUTO DIFF WBC: CPT | Performed by: INTERNAL MEDICINE

## 2022-10-15 PROCEDURE — 99232 SBSQ HOSP IP/OBS MODERATE 35: CPT | Performed by: INTERNAL MEDICINE

## 2022-10-15 PROCEDURE — 25010000002 PIPERACILLIN SOD-TAZOBACTAM PER 1 G: Performed by: SURGERY

## 2022-10-15 PROCEDURE — 80053 COMPREHEN METABOLIC PANEL: CPT | Performed by: INTERNAL MEDICINE

## 2022-10-15 RX ORDER — FENTANYL/ROPIVACAINE/NS/PF 2-625MCG/1
30 PLASTIC BAG, INJECTION (ML) EPIDURAL ONCE
Status: DISCONTINUED | OUTPATIENT
Start: 2022-10-15 | End: 2022-10-15

## 2022-10-15 RX ORDER — FENTANYL/ROPIVACAINE/NS/PF 2-625MCG/1
15 PLASTIC BAG, INJECTION (ML) EPIDURAL
Status: COMPLETED | OUTPATIENT
Start: 2022-10-15 | End: 2022-10-15

## 2022-10-15 RX ORDER — MAGNESIUM SULFATE HEPTAHYDRATE 40 MG/ML
2 INJECTION, SOLUTION INTRAVENOUS ONCE
Status: COMPLETED | OUTPATIENT
Start: 2022-10-15 | End: 2022-10-15

## 2022-10-15 RX ORDER — ENOXAPARIN SODIUM 100 MG/ML
40 INJECTION SUBCUTANEOUS EVERY 24 HOURS
Status: DISCONTINUED | OUTPATIENT
Start: 2022-10-15 | End: 2022-10-20 | Stop reason: HOSPADM

## 2022-10-15 RX ORDER — MORPHINE SULFATE 2 MG/ML
2 INJECTION, SOLUTION INTRAMUSCULAR; INTRAVENOUS EVERY 4 HOURS PRN
Status: DISCONTINUED | OUTPATIENT
Start: 2022-10-15 | End: 2022-10-16

## 2022-10-15 RX ADMIN — TAZOBACTAM SODIUM AND PIPERACILLIN SODIUM 3.38 G: 375; 3 INJECTION, SOLUTION INTRAVENOUS at 02:22

## 2022-10-15 RX ADMIN — MORPHINE SULFATE 2 MG: 2 INJECTION, SOLUTION INTRAMUSCULAR; INTRAVENOUS at 23:45

## 2022-10-15 RX ADMIN — HYDROCODONE BITARTRATE AND ACETAMINOPHEN 1 TABLET: 10; 325 TABLET ORAL at 04:38

## 2022-10-15 RX ADMIN — HYDROCODONE BITARTRATE AND ACETAMINOPHEN 1 TABLET: 10; 325 TABLET ORAL at 19:48

## 2022-10-15 RX ADMIN — SENNOSIDES AND DOCUSATE SODIUM 2 TABLET: 8.6; 5 TABLET ORAL at 10:29

## 2022-10-15 RX ADMIN — HYDROCODONE BITARTRATE AND ACETAMINOPHEN 1 TABLET: 5; 325 TABLET ORAL at 11:17

## 2022-10-15 RX ADMIN — Medication 10 ML: at 09:15

## 2022-10-15 RX ADMIN — SENNOSIDES AND DOCUSATE SODIUM 2 TABLET: 8.6; 5 TABLET ORAL at 20:00

## 2022-10-15 RX ADMIN — POTASSIUM PHOSPHATE, MONOBASIC AND POTASSIUM PHOSPHATE, DIBASIC 15 MMOL: 224; 236 INJECTION, SOLUTION, CONCENTRATE INTRAVENOUS at 17:30

## 2022-10-15 RX ADMIN — POTASSIUM PHOSPHATE, MONOBASIC AND POTASSIUM PHOSPHATE, DIBASIC 15 MMOL: 224; 236 INJECTION, SOLUTION, CONCENTRATE INTRAVENOUS at 15:17

## 2022-10-15 RX ADMIN — MAGNESIUM SULFATE HEPTAHYDRATE 2 G: 2 INJECTION, SOLUTION INTRAVENOUS at 12:42

## 2022-10-15 NOTE — PLAN OF CARE
Goal Outcome Evaluation:           Progress: improving  Outcome Evaluation: VSS. Medicated 2x for pain. Adequate pain relief per patient. Pt refused turns and education was provided on importance. Surgical dressing dry and intact. Adequate urine output from urinal. Flatus present but no bowel movement. Education provided on incentive spirometer.

## 2022-10-15 NOTE — PROGRESS NOTES
Pulmonary / Critical Care Progress Note      Patient Name: Uche Pereyra  : 1960  MRN: [id number]  Attending:  Karlie Hernandez MD   Date of admission: 10/11/2022    Subjective   Subjective   Follow-up for severe sepsis, maggot infestation and gangrene right lower extremity    Had right AKA with primary closure 10/13/2022    Continues to improve  Does have multiple electrolyte disturbances more  Tolerating diet  Surgical site pain improved.  3 out of 10 in severity, nonradiating, worse with movement and relieved with pain medication and rest  No recent fevers  Remains weak and fatigued  Denies any respiratory symptoms or chest pain  No nausea, fevers or chills    Review of Systems  Constitutional symptoms:   Fatigue, otherwise denied complaints   Ear, nose, throat: Denied complaints  Cardiovascular:  Denied complaints  Respiratory: Denied complaints  Gastrointestinal: Denied complaints  Musculoskeletal: Weakness, surgical site pain right lower extremity, otherwise denied complaints  Neurologic: Denied complaints  Skin: Denied complaints        Objective   Objective     Vitals:   Vital signs for last 24 hours:  Temp:  [97.5 °F (36.4 °C)-98.6 °F (37 °C)] 97.9 °F (36.6 °C)  Heart Rate:  [] 102  Resp:  [16] 16  BP: ()/(51-72) 103/70    Intake/Output last 3 shifts:  I/O last 3 completed shifts:  In: 3570 [P.O.:360; I.V.:3060; IV Piggyback:150]  Out: 4350 [Urine:4150; Blood:200]  Intake/Output this shift:  No intake/output data recorded.    Vent settings for last 24 hours:       Hemodynamic parameters for last 24 hours:       Physical Exam   Vital Signs Reviewed   General: Thin frail cachectic male, Alert, NAD.    HEENT:  PERRL, EOMI.  OP, nares clear  Neck:  Supple, no JVD, no thyromegaly  Chest:  good aeration, clear to auscultation bilaterally, tympanic to percussion bilaterally, no work of breathing noted, scaphoid chest  CV: rrr, no MGR, pulses 2+, equal.  Abd:  Soft, NT, ND, + BS, no  HSM  EXT:  no clubbing, no cyanosis,  right AKA stump noted and closed.  Incision is clean and intact  Neuro:  A&Ox3, CN grossly intact, no focal deficits.  Skin: No rashes or lesions noted         Result Review    Result Review:  I have personally reviewed the results from the time of this admission to 10/15/2022 07:01 EDT and agree with these findings:  [x]  Laboratory  [x]  Microbiology  [x]  Radiology  [x]  EKG/Telemetry   []  Cardiology/Vascular   []  Pathology  []  Old records  []  Other:  Most notable findings include:      Lab 10/15/22  0537 10/14/22  1412 10/14/22  0321 10/13/22  0317 10/12/22  1406 10/12/22  0315 10/11/22  1943 10/11/22  1744   WBC 12.54*  --  17.31* 22.06*  --  20.56*  --  20.32*   HEMOGLOBIN 8.6*  --  7.7* 8.6*  --  9.3*  --  11.1*   HEMATOCRIT 24.5*  --  23.0* 25.8*  --  26.4*  --  32.5*   PLATELETS 609*  --  473* 546*  --  667*  --  792*   SODIUM  --  123* 125* 122* 123* 123* 121* 119*   POTASSIUM  --  3.9 3.5 3.9 3.4* 4.2 4.2 5.3*   CHLORIDE  --  91* 92* 90* 92* 89* 87* 83*   CO2  --  23.8 24.6 21.0* 22.1 21.5* 23.1 21.6*   BUN  --  8 8 6* 10 13 15 18   CREATININE  --  0.30* 0.34* 0.36* 0.39* 0.41* 0.49* 0.55*   GLUCOSE  --  150* 146* 94 118* 103* 104* 127*   CALCIUM  --  8.4* 8.1* 8.2* 8.1* 9.4 9.1 10.3   PHOSPHORUS  --  1.9* 3.1  --  2.7  --  3.0  --    TOTAL PROTEIN  --   --  5.4* 6.4  --   --   --  8.4   ALBUMIN  --  1.90* 1.80* 2.30* 2.40*  --   --  3.20*   GLOBULIN  --   --  3.6 4.1  --   --   --  5.2         Assessment & Plan   Assessment / Plan     Active Hospital Problems:  Active Hospital Problems    Diagnosis    • **Sepsis (HCC)    • Severe malnutrition (HCC)    • Gas gangrene (HCC)    • 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 (HCC)      Added automatically from request for surgery [id number]           Impression:  Gas gangrene of right lower extremity from gram-positive and gram-negative organisms  Severe sepsis  Arterial occlusion of right  lower extremity, chronic  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  Wound care per vascular surgery and wound care  Trend renal function and electrolytes.  Sodium slowly improving.  Replace potassium, magnesium and phosphorus IV  Regular diet as tolerated.  Supplements per dietitian.  Monitor closely for refeeding syndrome over the weekend  Smoking cessation encouraged.    DVT prophylaxis:  Mechanical DVT prophylaxis orders are present.    CODE STATUS:   Code Status (Patient has no pulse and is not breathing): CPR (Attempt to Resuscitate)  Medical Interventions (Patient has pulse or is breathing): Full Support      Labs, imaging, microbiology, notes and medications personally reviewed  Discussed with primary    Electronically signed by Boogie Eller MD, 10/15/22, 11:47 AM EDT.

## 2022-10-15 NOTE — PROGRESS NOTES
[facility]   Hospitalist Progress Note  Date: 10/15/2022  Patient Name: Uche Pereyra    Subjective   Subjective     Chief Complaint:   Leg pain    Summary:   62-year-old male presents to the hospital with right leg pain.  The patient is intermittently homeless usually living in a park in [place] but lives with his sister locally in the winter months.  He has been living with her since August and began having trouble with his leg several weeks ago but history is very inconsistent.  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  Severe hyponatremia, septic on presentation initiated IV antibiotics and placed in the ICU.  Underwent AKA on October 13 with rapid improvement postoperatively    Interval Followup: Patient complaining of some phantom limb pain but this is fairly well controlled.  He is eating well and slept well last night.  Says he feels so much better after having surgery    Review of Systems   No nausea vomiting no chest pain currently    Objective   Objective     Vitals:   Temp:  [97.5 °F (36.4 °C)-98.6 °F (37 °C)] 98.3 °F (36.8 °C)  Heart Rate:  [] 104  Resp:  [16-20] 20  BP: ()/(51-72) 115/72  Physical Exam   Gen: NAD, cachectic male sitting up in bed comfortably watching television  HEENT: NCAT, mmm, poor dentition  Resp: CTAB no wrr, no dyspnea  CV: RRR no mrg  GI: Abdomen soft NT, ND +bs  Psych: AOx3, normal mood and affect  MSK: Right AKA bandages and Ace wrap in place      Result Review    Result Review:  I have personally reviewed the results from 10/15/2022 12:48 EDT and agree with these findings:  [x]  Laboratory   Sodium 126  White count 22 now 12  Hemoglobin 9.3 now 8.6  AST ALT mildly elevated  [x]  Microbiology wound culture: Mixed gram-negative's  [x]  Radiology   []  EKG/Telemetry   []  Cardiology/Vascular   []  Pathology  []  Old records  []  Other:    Assessment & Plan   Assessment  / Plan     Assessment/Plan:  Sepsis due to right lower extremity gangrene  Severe gangrene of the right lower extremity  Peripheral vascular disease  Hyponatremia  Sepsis  Anemia  Elevated liver enzymes  Cachexia with weight loss     Plan:   Discontinue Zosyn  Sodium improving, expect sodium to continue to improve now that is source of sepsis has been removed  Monitor liver enzymes, recheck in a.m.  Continue Norco for pain control,  APS involved  Monitor electrolytes for refeeding syndrome    DVT ppx: SCDs changing to Lovenox  Diet: Regular  Discussed with bedside RN and critical care

## 2022-10-16 LAB
ALBUMIN SERPL-MCNC: 2.6 G/DL (ref 3.5–5.2)
ALBUMIN/GLOB SERPL: 0.7 G/DL
ALP SERPL-CCNC: 236 U/L (ref 39–117)
ALT SERPL W P-5'-P-CCNC: 89 U/L (ref 1–41)
ANION GAP SERPL CALCULATED.3IONS-SCNC: 9.8 MMOL/L (ref 5–15)
AST SERPL-CCNC: 75 U/L (ref 1–40)
BACTERIA SPEC AEROBE CULT: NORMAL
BACTERIA SPEC AEROBE CULT: NORMAL
BILIRUB SERPL-MCNC: 0.4 MG/DL (ref 0–1.2)
BUN SERPL-MCNC: 5 MG/DL (ref 8–23)
BUN/CREAT SERPL: 12.5 (ref 7–25)
CALCIUM SPEC-SCNC: 9.1 MG/DL (ref 8.6–10.5)
CHLORIDE SERPL-SCNC: 90 MMOL/L (ref 98–107)
CO2 SERPL-SCNC: 27.2 MMOL/L (ref 22–29)
CREAT SERPL-MCNC: 0.4 MG/DL (ref 0.76–1.27)
DEPRECATED RDW RBC AUTO: 51.5 FL (ref 37–54)
EGFRCR SERPLBLD CKD-EPI 2021: 122.6 ML/MIN/1.73
ERYTHROCYTE [DISTWIDTH] IN BLOOD BY AUTOMATED COUNT: 15.7 % (ref 12.3–15.4)
GLOBULIN UR ELPH-MCNC: 4 GM/DL
GLUCOSE SERPL-MCNC: 113 MG/DL (ref 65–99)
HCT VFR BLD AUTO: 25.2 % (ref 37.5–51)
HGB BLD-MCNC: 8.6 G/DL (ref 13–17.7)
MAGNESIUM SERPL-MCNC: 1.9 MG/DL (ref 1.6–2.4)
MCH RBC QN AUTO: 30.5 PG (ref 26.6–33)
MCHC RBC AUTO-ENTMCNC: 34.1 G/DL (ref 31.5–35.7)
MCV RBC AUTO: 89.4 FL (ref 79–97)
PHOSPHATE SERPL-MCNC: 4 MG/DL (ref 2.5–4.5)
PLATELET # BLD AUTO: 583 10*3/MM3 (ref 140–450)
PMV BLD AUTO: 8 FL (ref 6–12)
POTASSIUM SERPL-SCNC: 3.9 MMOL/L (ref 3.5–5.2)
PROT SERPL-MCNC: 6.6 G/DL (ref 6–8.5)
RBC # BLD AUTO: 2.82 10*6/MM3 (ref 4.14–5.8)
SODIUM SERPL-SCNC: 127 MMOL/L (ref 136–145)
WBC NRBC COR # BLD: 9.97 10*3/MM3 (ref 3.4–10.8)

## 2022-10-16 PROCEDURE — 99232 SBSQ HOSP IP/OBS MODERATE 35: CPT | Performed by: INTERNAL MEDICINE

## 2022-10-16 PROCEDURE — 85027 COMPLETE CBC AUTOMATED: CPT | Performed by: INTERNAL MEDICINE

## 2022-10-16 PROCEDURE — 84100 ASSAY OF PHOSPHORUS: CPT | Performed by: INTERNAL MEDICINE

## 2022-10-16 PROCEDURE — 99233 SBSQ HOSP IP/OBS HIGH 50: CPT | Performed by: INTERNAL MEDICINE

## 2022-10-16 PROCEDURE — 83735 ASSAY OF MAGNESIUM: CPT | Performed by: INTERNAL MEDICINE

## 2022-10-16 PROCEDURE — 80053 COMPREHEN METABOLIC PANEL: CPT | Performed by: INTERNAL MEDICINE

## 2022-10-16 RX ADMIN — HYDROCODONE BITARTRATE AND ACETAMINOPHEN 1 TABLET: 10; 325 TABLET ORAL at 02:08

## 2022-10-16 RX ADMIN — Medication 10 ML: at 20:47

## 2022-10-16 RX ADMIN — HYDROCODONE BITARTRATE AND ACETAMINOPHEN 1 TABLET: 10; 325 TABLET ORAL at 20:47

## 2022-10-16 RX ADMIN — Medication 10 ML: at 08:05

## 2022-10-16 NOTE — PLAN OF CARE
Goal Outcome Evaluation:           Progress: no change  Outcome Evaluation: VSS. Medicated 3x for pain. Recieved order for Morphine since pain was not adeqautely controlled with Norco initially. Surgical dressing intact. Pt refused to be turned q2 hours and education was provided. Adequate urine output from urinal. No bowel movement.

## 2022-10-16 NOTE — PROGRESS NOTES
[facility]   Hospitalist Progress Note  Date: 10/16/2022  Patient Name: Uche Pereyra    Subjective   Subjective     Chief Complaint:   Leg pain    Summary:   62-year-old male presents to the hospital with right leg pain.  The patient is intermittently homeless usually living in a park in [place] but lives with his sister locally in the winter months.  He has been living with her since August and began having trouble with his leg several weeks ago but history is very inconsistent.  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  Severe hyponatremia, septic on presentation initiated IV antibiotics and placed in the ICU.  Underwent AKA on October 13 with rapid improvement postoperatively and his vital signs and electrolyte abnormalities.    Interval Followup: Patient reports eating well, is still having some hip pain involving his surgical site and possible phantom pain    Review of Systems   No nausea vomiting no chest pain currently    Objective   Objective     Vitals:   Temp:  [97.3 °F (36.3 °C)-97.8 °F (36.6 °C)] 97.5 °F (36.4 °C)  Heart Rate:  [] 119  Resp:  [16-22] 16  BP: (106-120)/(62-74) 111/67  Physical Exam   Gen: NAD, cachectic male sitting on the side of the bed in moderate discomfort  HEENT: NCAT, mmm, poor dentition  Resp: CTAB no wrr, no dyspnea  CV: RRR no mrg  GI: Abdomen soft NT, ND +bs  Psych: AOx3, normal mood and affect  MSK: Right AKA bandages and Ace wrap in place      Result Review    Result Review:  I have personally reviewed the results from 10/16/2022 13:50 EDT and agree with these findings:  [x]  Laboratory   Sodium 127  White count 22 now 9  Hemoglobin 9.3 now 8.6  AST ALT mildly elevated but improving  [x]  Microbiology   [x]  Radiology   []  EKG/Telemetry   []  Cardiology/Vascular   []  Pathology  []  Old records  []  Other:    Assessment & Plan   Assessment / Plan      Assessment/Plan:  Sepsis due to right lower extremity gangrene  Severe gangrene of the right lower extremity  Peripheral vascular disease  Hyponatremia  Sepsis  Anemia  Elevated liver enzymes  Cachexia with weight loss     Plan:   Sodium improving, expect sodium to continue to improve now that is source of sepsis has been removed  Monitor liver enzymes, recheck in a.m., elevation may be secondary to stress of his underlying illness which is improving  Continue Norco for pain control,  APS involved, will need rehab and possible long-term placement  Monitor electrolytes for refeeding syndrome, no supplementation needed today    DVT ppx: Lovenox  Diet: Regular  Discussed with bedside RN and critical care

## 2022-10-16 NOTE — PROGRESS NOTES
Pulmonary / Critical Care Progress Note      Patient Name: Uche Pereyra  : 1960  MRN: [id number]  Attending:  Karlie Hernandez MD   Date of admission: 10/11/2022    Subjective   Subjective   Follow-up for severe sepsis, maggot infestation and gangrene right lower extremity    Had right AKA with primary closure 10/13/2022    Slowly improving  Tolerating diet  Surgical site pain improved.  2 out of 10 in severity, nonradiating, worse with movement.  Relieved with morphine and rest  No recent fevers  Remains weak and fatigued  Denies any respiratory symptoms or chest pain  No nausea, fevers or chills    Review of Systems  Constitutional symptoms:   Fatigue, otherwise denied complaints   Cardiovascular:  Denied complaints  Respiratory: Denied complaints  Gastrointestinal: Denied complaints  Musculoskeletal: Weakness, surgical site pain right lower extremity, otherwise denied complaints      Objective   Objective     Vitals:   Vital signs for last 24 hours:  Temp:  [97.3 °F (36.3 °C)-97.8 °F (36.6 °C)] 97.3 °F (36.3 °C)  Heart Rate:  [] 102  Resp:  [18-22] 18  BP: (106-120)/(62-74) 106/64    Intake/Output last 3 shifts:  I/O last 3 completed shifts:  In: 2460 [P.O.:2360; IV Piggyback:100]  Out: 6900 [Urine:6900]  Intake/Output this shift:  No intake/output data recorded.    Vent settings for last 24 hours:       Hemodynamic parameters for last 24 hours:       Physical Exam   Vital Signs Reviewed   General: Thin frail cachectic male, Alert, NAD.    HEENT:  PERRL, EOMI.  OP, nares clear  Neck:  Supple, no JVD, no thyromegaly  Chest:  good aeration, clear to auscultation bilaterally, tympanic to percussion bilaterally, no work of breathing noted, scaphoid chest  CV: rrr, no MGR, pulses 2+, equal.  Abd:  Soft, NT, ND, + BS, no HSM  EXT:  no clubbing, no cyanosis,  right AKA stump noted and closed.  Incision is clean and intact  Neuro:  A&Ox3, CN grossly intact, no focal deficits.  Skin: No rashes or  lesions noted         Result Review    Result Review:  I have personally reviewed the results from the time of this admission to 10/16/2022 11:55 EDT and agree with these findings:  [x]  Laboratory  [x]  Microbiology  [x]  Radiology  [x]  EKG/Telemetry   []  Cardiology/Vascular   []  Pathology  []  Old records  []  Other:  Most notable findings include:      Lab 10/16/22  0517 10/15/22  0537 10/14/22  1412 10/14/22  0321 10/13/22  0317 10/12/22  1406 10/12/22  0315 10/11/22  1943 10/11/22  1744   WBC 9.97 12.54*  --  17.31* 22.06*  --  20.56*  --  20.32*   HEMOGLOBIN 8.6* 8.6*  --  7.7* 8.6*  --  9.3*  --  11.1*   HEMATOCRIT 25.2* 24.5*  --  23.0* 25.8*  --  26.4*  --  32.5*   PLATELETS 583* 609*  --  473* 546*  --  667*  --  792*   SODIUM 127* 126* 123* 125* 122* 123* 123* 121* 119*   POTASSIUM 3.9 3.8 3.9 3.5 3.9 3.4* 4.2 4.2 5.3*   CHLORIDE 90* 91* 91* 92* 90* 92* 89* 87* 83*   CO2 27.2 23.8 23.8 24.6 21.0* 22.1 21.5* 23.1 21.6*   BUN 5* 6* 8 8 6* 10 13 15 18   CREATININE 0.40* 0.39* 0.30* 0.34* 0.36* 0.39* 0.41* 0.49* 0.55*   GLUCOSE 113* 114* 150* 146* 94 118* 103* 104* 127*   CALCIUM 9.1 8.2* 8.4* 8.1* 8.2* 8.1* 9.4 9.1 10.3   PHOSPHORUS 4.0 2.3* 1.9* 3.1  --  2.7  --  3.0  --    TOTAL PROTEIN 6.6 6.0  --  5.4* 6.4  --   --   --  8.4   ALBUMIN 2.60* 2.10* 1.90* 1.80* 2.30* 2.40*  --   --  3.20*   GLOBULIN 4.0 3.9  --  3.6 4.1  --   --   --  5.2         Assessment & Plan   Assessment / Plan     Active Hospital Problems:  Active Hospital Problems    Diagnosis    • **Sepsis (HCC)    • Severe malnutrition (HCC)    • Gas gangrene (HCC)    • 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 (HCC)      Added automatically from request for surgery [id number]           Impression:  Gas gangrene of right lower extremity from gram-positive and gram-negative organisms  Severe sepsis  Arterial occlusion of right lower extremity, chronic  Maggot infestation of gas gangrene right lower  extremity  Hyponatremia  Hypokalemia  Hypomagnesemia  Hyperglycemia  Anemia of chronic disease  Leukocytosis  Transaminitis  Severe protein calorie malnutrition with muscle wasting, poor oral intake and cachexia  Ongoing tobacco use of cigarettes     Plan:  Off antibiotics.  Amputation with source control.  Cultures negative  Continue current pain regimen  Appreciate wound care and vascular surgery assistance  Trend renal function and electrolytes.  Sodium slowly improving.  Intermittently checking for refeeding syndrome  Regular diet as tolerated.  Supplements per dietitian.   Smoking cessation encouraged.  APS evaluating situation.  [facility] case management assistance      DVT prophylaxis:  Medical DVT prophylaxis orders are present.    CODE STATUS:   Code Status (Patient has no pulse and is not breathing): CPR (Attempt to Resuscitate)  Medical Interventions (Patient has pulse or is breathing): Full Support      Labs, imaging, microbiology, notes and medications personally reviewed  Discussed with primary    I will sign off.  Please call with questions.    Electronically signed by Boogie Eller MD, 10/16/22, 11:56 AM EDT.

## 2022-10-17 LAB
ALBUMIN SERPL-MCNC: 2.7 G/DL (ref 3.5–5.2)
ALBUMIN/GLOB SERPL: 0.8 G/DL
ALP SERPL-CCNC: 213 U/L (ref 39–117)
ALT SERPL W P-5'-P-CCNC: 67 U/L (ref 1–41)
ANION GAP SERPL CALCULATED.3IONS-SCNC: 11.7 MMOL/L (ref 5–15)
AST SERPL-CCNC: 45 U/L (ref 1–40)
BILIRUB SERPL-MCNC: 0.4 MG/DL (ref 0–1.2)
BUN SERPL-MCNC: 5 MG/DL (ref 8–23)
BUN/CREAT SERPL: 11.9 (ref 7–25)
CALCIUM SPEC-SCNC: 9.2 MG/DL (ref 8.6–10.5)
CHLORIDE SERPL-SCNC: 93 MMOL/L (ref 98–107)
CO2 SERPL-SCNC: 24.3 MMOL/L (ref 22–29)
CREAT SERPL-MCNC: 0.42 MG/DL (ref 0.76–1.27)
DEPRECATED RDW RBC AUTO: 51.8 FL (ref 37–54)
EGFRCR SERPLBLD CKD-EPI 2021: 120.8 ML/MIN/1.73
ERYTHROCYTE [DISTWIDTH] IN BLOOD BY AUTOMATED COUNT: 15.9 % (ref 12.3–15.4)
GLOBULIN UR ELPH-MCNC: 3.6 GM/DL
GLUCOSE SERPL-MCNC: 110 MG/DL (ref 65–99)
HCT VFR BLD AUTO: 25 % (ref 37.5–51)
HGB BLD-MCNC: 8.4 G/DL (ref 13–17.7)
IRON 24H UR-MRATE: 22 MCG/DL (ref 59–158)
IRON SATN MFR SERPL: 13 % (ref 20–50)
MAGNESIUM SERPL-MCNC: 1.8 MG/DL (ref 1.6–2.4)
MCH RBC QN AUTO: 29.9 PG (ref 26.6–33)
MCHC RBC AUTO-ENTMCNC: 33.6 G/DL (ref 31.5–35.7)
MCV RBC AUTO: 89 FL (ref 79–97)
PHOSPHATE SERPL-MCNC: 3.7 MG/DL (ref 2.5–4.5)
PLATELET # BLD AUTO: 612 10*3/MM3 (ref 140–450)
PMV BLD AUTO: 8.2 FL (ref 6–12)
POTASSIUM SERPL-SCNC: 3.7 MMOL/L (ref 3.5–5.2)
PROT SERPL-MCNC: 6.3 G/DL (ref 6–8.5)
RBC # BLD AUTO: 2.81 10*6/MM3 (ref 4.14–5.8)
SODIUM SERPL-SCNC: 129 MMOL/L (ref 136–145)
TIBC SERPL-MCNC: 173 MCG/DL (ref 298–536)
TRANSFERRIN SERPL-MCNC: 116 MG/DL (ref 200–360)
WBC NRBC COR # BLD: 8.18 10*3/MM3 (ref 3.4–10.8)

## 2022-10-17 PROCEDURE — 83735 ASSAY OF MAGNESIUM: CPT | Performed by: INTERNAL MEDICINE

## 2022-10-17 PROCEDURE — 97161 PT EVAL LOW COMPLEX 20 MIN: CPT

## 2022-10-17 PROCEDURE — 83540 ASSAY OF IRON: CPT | Performed by: INTERNAL MEDICINE

## 2022-10-17 PROCEDURE — 99024 POSTOP FOLLOW-UP VISIT: CPT | Performed by: SURGERY

## 2022-10-17 PROCEDURE — 99233 SBSQ HOSP IP/OBS HIGH 50: CPT | Performed by: INTERNAL MEDICINE

## 2022-10-17 PROCEDURE — 25010000002 NA FERRIC GLUC CPLX PER 12.5 MG: Performed by: INTERNAL MEDICINE

## 2022-10-17 PROCEDURE — 85027 COMPLETE CBC AUTOMATED: CPT | Performed by: INTERNAL MEDICINE

## 2022-10-17 PROCEDURE — 80053 COMPREHEN METABOLIC PANEL: CPT | Performed by: INTERNAL MEDICINE

## 2022-10-17 PROCEDURE — 97165 OT EVAL LOW COMPLEX 30 MIN: CPT

## 2022-10-17 PROCEDURE — 84100 ASSAY OF PHOSPHORUS: CPT | Performed by: INTERNAL MEDICINE

## 2022-10-17 PROCEDURE — 84466 ASSAY OF TRANSFERRIN: CPT | Performed by: INTERNAL MEDICINE

## 2022-10-17 RX ADMIN — HYDROCODONE BITARTRATE AND ACETAMINOPHEN 1 TABLET: 10; 325 TABLET ORAL at 10:27

## 2022-10-17 RX ADMIN — HYDROCODONE BITARTRATE AND ACETAMINOPHEN 1 TABLET: 10; 325 TABLET ORAL at 04:27

## 2022-10-17 RX ADMIN — Medication 10 ML: at 21:38

## 2022-10-17 RX ADMIN — HYDROCODONE BITARTRATE AND ACETAMINOPHEN 1 TABLET: 10; 325 TABLET ORAL at 15:35

## 2022-10-17 RX ADMIN — SODIUM CHLORIDE 250 MG: 9 INJECTION, SOLUTION INTRAVENOUS at 14:41

## 2022-10-17 RX ADMIN — Medication 5 MG: at 21:38

## 2022-10-17 RX ADMIN — ACETAMINOPHEN 650 MG: 325 TABLET ORAL at 12:40

## 2022-10-17 RX ADMIN — Medication 10 ML: at 08:52

## 2022-10-17 RX ADMIN — HYDROCODONE BITARTRATE AND ACETAMINOPHEN 1 TABLET: 10; 325 TABLET ORAL at 21:38

## 2022-10-17 NOTE — THERAPY EVALUATION
Acute Care - Physical Therapy Initial Evaluation   [facility]     Patient Name: Uche Pereyra  : 1960  MRN: [id number]  Today's Date: 10/17/2022      Visit Dx:     ICD-10-CM ICD-9-CM   1. Sepsis, due to unspecified organism, unspecified whether acute organ dysfunction present (HCC)  A41.9 038.9     995.91   2. 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 Decreased activities of daily living (ADL)  Z78.9 V49.89   4. 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 No pertinent past medical history.  Past Surgical History:   Procedure Laterality Date   • ABOVE KNEE AMPUTATION Right 10/13/2022    Procedure: Right above-the-knee amputation;  Surgeon: Ghulam Whittington MD;  Location: [facility] OR;  Service: Vascular;  Laterality: Right;     PT Assessment (last 12 hours)     PT Evaluation and Treatment     Row Name 10/17/22 1500          Physical Therapy Time and Intention    Document Type evaluation  -AV     Mode of Treatment individual therapy;physical therapy  -AV     Row Name 10/17/22 1500          General Information    Patient Profile Reviewed yes  -AV     Prior Level of Function independent:;all household mobility;gait;transfer;ADL's  Ambulated without an assistive device. Homeless living in a park in [place] during warmer months. Stays with sister during coler months. No home O2.  -AV     Equipment Currently Used at Home none  -AV     Existing Precautions/Restrictions fall  -AV     Row Name 10/17/22 1500          Living Environment    Current Living Arrangements home  -AV     People in Home sibling(s)  Sister  -AV     Row Name 10/17/22 1500          Range of Motion (ROM)    Range of Motion bilateral lower extremities;ROM is WFL  -AV     Row Name  10/17/22 1500          Strength (Manual Muscle Testing)    Strength (Manual Muscle Testing) bilateral lower extremities;strength is WFL  -AV     Row Name 10/17/22 1500          Bed Mobility    Comment, (Bed Mobility) Patient seated upright in bed upon therapist entry. Patient able to bring self to edge of bed with SBA.  -AV     Row Name 10/17/22 1500          Transfers    Transfers sit-stand transfer;stand-sit transfer;bed-chair transfer  -AV     Row Name 10/17/22 1500          Bed-Chair Transfer    Bed-Chair [place] (Transfers) minimum assist (75% patient effort)  -AV     Assistive Device (Bed-Chair Transfers) walker, front-wheeled  -AV     Row Name 10/17/22 1500          Sit-Stand Transfer    Sit-Stand [place] (Transfers) minimum assist (75% patient effort)  -AV     Assistive Device (Sit-Stand Transfers) walker, front-wheeled  -AV     Row Name 10/17/22 1500          Stand-Sit Transfer    Stand-Sit [place] (Transfers) minimum assist (75% patient effort)  -AV     Assistive Device (Stand-Sit Transfers) walker, front-wheeled  -AV     Row Name 10/17/22 1500          Gait/Stairs (Locomotion)    Gait/Stairs Locomotion gait/ambulation independence;gait/ambulation assistive device;distance ambulated  -AV     [place] Level (Gait) minimum assist (75% patient effort)  -AV     Assistive Device (Gait) walker, front-wheeled  -AV     Distance in Feet (Gait) 4 hop steps from bed to recliner  -AV     Row Name 10/17/22 1500          Safety Issues, Functional Mobility    Safety Issues Affecting Function (Mobility) insight into deficits/self-awareness;awareness of need for assistance  -AV     Impairments Affecting Function (Mobility) balance;endurance/activity tolerance;pain;strength  -AV     Row Name 10/17/22 1500          Balance    Balance Assessment standing dynamic balance  -AV     Dynamic Standing Balance minimal assist  -AV     Position/Device Used, Standing Balance supported;walker, front-wheeled  -AV      Row Name             Wound coccyx    Wound - Properties Group Location: coccyx  -DS    Retired Wound - Properties Group Location: coccyx  -DS    Retired Wound - Properties Group Location: coccyx  -DS    Row Name             Wound 10/13/22 2000 Right anterior thigh Amputation stump    Wound - Properties Group Placement Date: 10/13/22  -DS Placement Time: 2000 -DS Present on Hospital Admission: N  -DS Side: Right  -DS Orientation: anterior  -DS Location: thigh  -DS Primary Wound Type: Amputation s  -DS    Retired Wound - Properties Group Placement Date: 10/13/22  -DS Placement Time: 2000 -DS Present on Hospital Admission: N  -DS Side: Right  -DS Orientation: anterior  -DS Location: thigh  -DS Primary Wound Type: Amputation s  -DS    Retired Wound - Properties Group Date first assessed: 10/13/22  -DS Time first assessed: 2000 -DS Present on Hospital Admission: N  -DS Side: Right  -DS Location: thigh  -DS Primary Wound Type: Amputation s  -DS    Row Name 10/17/22 1500          Plan of Care Review    Plan of Care Reviewed With patient  -AV     Progress no change  -AV     Outcome Evaluation Patient presents with deficits in balance, endurance, transfers, and ambulation. Patient will benefit from skilled PT services to address these mobility deficits and decrease risk of falls. Recommend inpatient rehab following hospital discharge.  -AV     Row Name 10/17/22 1500          Therapy Assessment/Plan (PT)    Rehab Potential (PT) good, to achieve stated therapy goals  -AV     Criteria for Skilled Interventions Met (PT) yes;meets criteria  -AV     Therapy Frequency (PT) daily  -AV     Problem List (PT) problems related to;balance;mobility  -AV     Activity Limitations Related to Problem List (PT) unable to transfer safely;unable to ambulate safely  -AV     Row Name 10/17/22 1500          PT Evaluation Complexity    History, PT Evaluation Complexity 1-2 personal factors and/or comorbidities  -AV     Examination of Body Systems  (PT Eval Complexity) total of 4 or more elements  -AV     Clinical Presentation (PT Evaluation Complexity) stable  -AV     Clinical Decision Making (PT Evaluation Complexity) low complexity  -AV     Overall Complexity (PT Evaluation Complexity) low complexity  -AV     Row Name 10/17/22 1500          Therapy Plan Review/Discharge Plan (PT)    Therapy Plan Review (PT) evaluation/treatment results reviewed;patient  -AV     Row Name 10/17/22 1500          Physical Therapy Goals    Transfer Goal Selection (PT) transfer, PT goal 1  -AV     Gait Training Goal Selection (PT) gait training, PT goal 1  -AV     Row Name 10/17/22 1500          Transfer Goal 1 (PT)    Activity/Assistive Device (Transfer Goal 1, PT) transfers, all;walker, rolling  -AV     [place] Level/Cues Needed (Transfer Goal 1, PT) standby assist  -AV     Time Frame (Transfer Goal 1, PT) 10 days  -AV     Row Name 10/17/22 1500          Gait Training Goal 1 (PT)    Activity/Assistive Device (Gait Training Goal 1, PT) gait (walking locomotion);assistive device use;walker, rolling  -AV     [place] Level (Gait Training Goal 1, PT) standby assist  -AV     Distance (Gait Training Goal 1, PT) 100  -AV     Time Frame (Gait Training Goal 1, PT) 10 days  -AV           User Key  (r) = Recorded By, (t) = Taken By, (c) = Cosigned By    Initials Name Provider Type    Johana Cuellar, RN Registered Nurse    Sulaiman Stover, PT Physical Therapist                Physical Therapy Education     Title: PT OT SLP Therapies (In Progress)     Topic: Physical Therapy (In Progress)     Point: Mobility training (Done)     Learning Progress Summary           Patient Acceptance, E,TB, VU by EMILIANO at 10/17/2022 8204                   Point: Home exercise program (Not Started)     Learner Progress:  Not documented in this visit.          Point: Body mechanics (Done)     Learning Progress Summary           Patient Acceptance, E,TB, VU by EMILIANO at 10/17/2022 1262                    Point: Precautions (Done)     Learning Progress Summary           Patient Acceptance, E,TB, VU by AV at 10/17/2022 1528                               User Key     Initials Effective Dates Name Provider Type Discipline     06/11/21 -  Sulaiman Prater, PT Physical Therapist PT              PT Recommendation and Plan  Anticipated Discharge Disposition (PT): inpatient rehabilitation facility  Planned Therapy Interventions (PT): balance training, bed mobility training, gait training, neuromuscular re-education, strengthening, transfer training  Therapy Frequency (PT): daily  Plan of Care Reviewed With: patient  Progress: no change  Outcome Evaluation: Patient presents with deficits in balance, endurance, transfers, and ambulation. Patient will benefit from skilled PT services to address these mobility deficits and decrease risk of falls. Recommend inpatient rehab following hospital discharge.   Outcome Measures     Row Name 10/17/22 1500             How much help from another person do you currently need...    Turning from your back to your side while in flat bed without using bedrails? 4  -AV      Moving from lying on back to sitting on the side of a flat bed without bedrails? 4  -AV      Moving to and from a bed to a chair (including a wheelchair)? 3  -AV      Standing up from a chair using your arms (e.g., wheelchair, bedside chair)? 3  -AV      Climbing 3-5 steps with a railing? 2  -AV      To walk in hospital room? 2  -AV      AM-PAC 6 Clicks Score (PT) 18  -AV         Functional Assessment    Outcome Measure Options AM-PAC 6 Clicks Basic Mobility (PT)  -AV            User Key  (r) = Recorded By, (t) = Taken By, (c) = Cosigned By    Initials Name Provider Type    AV Sulaiman Prater, PT Physical Therapist                 Time Calculation:    PT Charges     Row Name 10/17/22 1527             Time Calculation    PT Received On 10/17/22  -AV      PT Goal Re-Cert Due Date 10/26/22  -AV         Untimed Charges     PT Eval/Re-eval Minutes 32  -AV         Total Minutes    Untimed Charges Total Minutes 32  -AV       Total Minutes 32  -AV            User Key  (r) = Recorded By, (t) = Taken By, (c) = Cosigned By    Initials Name Provider Type    AV Sulaiman Prater, PT Physical Therapist              Therapy Charges for Today     Code Description Service Date Service Provider Modifiers Qty    21121517817 HC PT EVAL LOW COMPLEXITY 3 10/17/2022 Sulaiman Prater, PT GP 1          PT G-Codes  Outcome Measure Options: AM-PAC 6 Clicks Basic Mobility (PT)  AM-PAC 6 Clicks Score (PT): 18  AM-PAC 6 Clicks Score (OT): 18    Sulaiman Prater PT  10/17/2022

## 2022-10-17 NOTE — CONSULTS
"Nutrition Services    Patient Name: Uche Pereyra  YOB: 1960  MRN: [id number]  Admission date: 10/11/2022      CLINICAL NUTRITION ASSESSMENT      Reason for Assessment  Identified at risk by screening criteria, BMI, Malnutrition Severity Assessment, Physician consult     H&P:    History reviewed. No pertinent past medical history.     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  BMI 14.9 on admission.  Patient is unable to quantify weight history, but thinks he has lost at least 10 lbs in the past month.  Patient has obvious severe total body muscle wasting and fat loss as noted below.     Patient on regular diet with variable PO intake.  Started oral nutrition supplements 10/14. Patient is receptive to strawberry or vanilla Ensure with meals. States he has eaten \"basically nothing\" for 2 weeks prior to hospital admission.   Patient at high risk for refeeding syndrome. Potassium and phosphorus WDL. Continues to be hyponatremic, orders for repletion in place. RD will CTM per protocol.     Anthropometrics        Current Height, Weight Height: 177.8 cm (70\")  Weight: 47 kg (103 lb 9.9 oz)   Current BMI Body mass index is 14.87 kg/m².       Weight Hx  Wt Readings from Last 30 Encounters:   10/11/22 2147 47 kg (103 lb 9.9 oz)   10/11/22 1703 46.4 kg (102 lb 4.7 oz)            Wt Change Observation -8.8% x 1 month per patient report     Estimated/Assessed Needs       Energy Requirements 30 kcal/kg adj BW   EST Needs (kcal/day) 1605 kcal        Protein Requirements 1.0-1.2 g/kg adj BW   EST Daily Needs (g/day) 54-64       Fluid Requirements 30 ml/kg adj BW    Estimated Needs (mL/day) 1605 " ml fluid      Labs/Medications         Pertinent Labs Reviewed.   Results from last 7 days   Lab Units 10/17/22  0519 10/16/22  0517 10/15/22  0537   SODIUM mmol/L 129* 127* 126*   POTASSIUM mmol/L 3.7 3.9 3.8   CHLORIDE mmol/L 93* 90* 91*   CO2 mmol/L 24.3 27.2 23.8   BUN mg/dL 5* 5* 6*   CREATININE mg/dL 0.42* 0.40* 0.39*   CALCIUM mg/dL 9.2 9.1 8.2*   BILIRUBIN mg/dL 0.4 0.4 0.4   ALK PHOS U/L 213* 236* 237*   ALT (SGPT) U/L 67* 89* 110*   AST (SGOT) U/L 45* 75* 131*   GLUCOSE mg/dL 110* 113* 114*     Results from last 7 days   Lab Units 10/17/22  0519 10/16/22  0517 10/15/22  0537 10/12/22  1406 10/12/22  0315   MAGNESIUM mg/dL 1.8 1.9 1.8   < >  --    PHOSPHORUS mg/dL 3.7 4.0 2.3*   < >  --    HEMOGLOBIN g/dL 8.4* 8.6* 8.6*   < > 9.3*   HEMATOCRIT % 25.0* 25.2* 24.5*   < > 26.4*   TRIGLYCERIDES mg/dL  --   --   --   --  84    < > = values in this interval not displayed.     No results found for: COVID19  Lab Results   Component Value Date    HGBA1C 7.20 (H) 10/11/2022         Pertinent Medications Reviewed.     Current Nutrition Orders & Evaluation of Intake       Oral Nutrition     Current PO Diet Diet Regular   Supplement Orders Placed This Encounter      Dietary Nutrition Supplements Ensure Enlive       Malnutrition Severity Assessment      Patient meets criteria for : Severe Malnutrition         Nutrition Diagnosis         Nutrition Dx Problem 1 Severe malnutrition related to inadequate energy Intake as evidenced by inadequate energy intake., unintended wt change. and body composition changes.       Nutrition Intervention         Ensure Enlive TID (+1050 kcal, 60 g protein)     Medical Nutrition Therapy/Nutrition Education          Learner     Readiness Patient  Acceptance     Method     Response Explanation  Verbalizes understanding     Monitor/Evaluation        Monitor Per protocol, PO intake, Supplement intake, Pertinent labs, Weight, RFS       Nutrition Discharge Plan         To be determined        Electronically signed by:  Tavon Zayas RD  10/17/22 13:16 EDT

## 2022-10-17 NOTE — PLAN OF CARE
Goal Outcome Evaluation:  Plan of Care Reviewed With: patient        Progress: improving     VSS. Pt dressing changed, tolerated well. Pt pain kept under control, see EMAR. PT used walker and up to chair with physical therapy.

## 2022-10-17 NOTE — PROGRESS NOTES
[facility]     Progress Note    Patient Name: Uche Pereyra  : 1960  MRN: [id number]  Primary Care Physician:  Provider, No Known  Date of admission: 10/11/2022    Subjective   Subjective     POD #4 status post right AKA    Doing well.  Mild pain but much better.  No other complaints.    Objective   Objective     Vitals:   Temp:  [97.6 °F (36.4 °C)-98.4 °F (36.9 °C)] 97.6 °F (36.4 °C)  Heart Rate:  [101-110] 105  Resp:  [16-18] 18  BP: ()/(58-64) 102/58    Physical Exam   General: Alert, no acute distress  Right AKA: Ace wrap removed, Aquacel in place.  No erythema, no drainage, small amount of dried blood.      Assessment & Plan   Assessment / Plan     Assessment/Plan:    Satisfactory progress following right AKA.  Stable from the vascular surgery standpoint.  Okay for discharge from my standpoint when medically stable.  Follow-up with me as an outpatient in 4 weeks.  Discussed with RN to remove Aquacel slowly, then sterile 4 x 4's, Kerlix rolls, Ace every other day.      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

## 2022-10-17 NOTE — PLAN OF CARE
Goal Outcome Evaluation:  Plan of Care Reviewed With: patient, sibling        Progress: no change  Outcome Evaluation: Patient presents with limitations in self-care, functional transfers, balance, and endurance. He would benefit from continued skilled occupational therapy services to maximize independence with ADLs/functional transfers. Inpatient rehab facility or home with home health therapy services recommended upon discharge from hospital.

## 2022-10-17 NOTE — PLAN OF CARE
Goal Outcome Evaluation:  Plan of Care Reviewed With: patient        Progress: no change  Outcome Evaluation: Patient presents with deficits in balance, endurance, transfers, and ambulation. Patient will benefit from skilled PT services to address these mobility deficits and decrease risk of falls. Recommend inpatient rehab following hospital discharge.

## 2022-10-17 NOTE — PROGRESS NOTES
[facility]   Hospitalist Progress Note  Date: 10/17/2022  Patient Name: Uche Pereyra    Subjective   Subjective     Chief Complaint:   Leg pain    Summary:   62-year-old male presents to the hospital with right leg pain.  The patient is intermittently homeless usually living in a park in [place] but lives with his sister locally in the winter months.  He has been living with her since August and began having trouble with his leg several weeks ago.  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  Severe hyponatremia, septic on presentation initiated IV antibiotics and placed in the ICU.  Underwent AKA on October 13 with rapid improvement postoperatively and his vital signs and electrolyte abnormalities.  Patient is stable on the medical floor, no required antibiotics due to source control from surgery and currently is awaiting rehab placement.    Interval Followup: Patient reports eating well, wants to take a nap today, states that his right lower extremity pain and hip soreness is improving    Review of Systems   No nausea vomiting no chest pain currently    Objective   Objective     Vitals:   Temp:  [97.6 °F (36.4 °C)-98.4 °F (36.9 °C)] 97.6 °F (36.4 °C)  Heart Rate:  [101-110] 105  Resp:  [16-18] 18  BP: ()/(58-64) 102/58  Physical Exam   Gen: NAD, cachectic male sitting up in the recliner comfortably  HEENT: NCAT, mmm, poor dentition  Resp: CTAB no wrr, no dyspnea  CV: RRR no mrg  GI: Abdomen soft NT, ND +bs  Psych: AOx3, normal mood and affect  MSK: Right AKA bandages and Ace wrap in place      Result Review    Result Review:  I have personally reviewed the results from 10/17/2022 13:46 EDT and agree with these findings:  [x]  Laboratory   Sodium 129  White count 22 now 9  Hemoglobin 9.3 now 8.6   TSAT 13%, iron 22  AST ALT mildly elevated but improving  [x]  Microbiology   [x]  Radiology   []  EKG/Telemetry   []   Cardiology/Vascular   []  Pathology  []  Old records  []  Other:    Assessment & Plan   Assessment / Plan     Assessment/Plan:  Sepsis due to right lower extremity gangrene  Severe gangrene of the right lower extremity  Peripheral vascular disease  Hyponatremia due to sepsis  Anemia, iron deficiency  Elevated liver enzymes  Cachexia with weight loss     Plan:   Sodium improving spontaneously after amputation, monitor but no intervention needed at this time  Liver enzymes improving, recheck in a.m.  Continue Norco for pain control  APS involved, will need rehab and possible long-term placement  Has not needed electrolyte supplementation in several days, discontinue electrolyte monitoring for refeeding syndrome  Iron studies consistent with iron deficiency anemia, ordering IV iron supplementation x1 today, will need oral supplementation at discharge  Appropriate for discharge to rehab when bed available    DVT ppx: Lovenox  Diet: Regular  Discussed with bedside RN and vascular

## 2022-10-17 NOTE — THERAPY EVALUATION
Patient Name: Uche Pereyra  : 1960    MRN: [id number]                              Today's Date: 10/17/2022       Admit Date: 10/11/2022    Visit Dx:     ICD-10-CM ICD-9-CM   1. Sepsis, due to unspecified organism, unspecified whether acute organ dysfunction present (HCC)  A41.9 038.9     995.91   2. 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 No pertinent past medical history.  Past Surgical History:   Procedure Laterality Date   • ABOVE KNEE AMPUTATION Right 10/13/2022    Procedure: Right above-the-knee amputation;  Surgeon: Ghulam Whittington MD;  Location: [facility] OR;  Service: Vascular;  Laterality: Right;      General Information     Row Name 10/17/22 1345          OT Time and Intention    Document Type evaluation  -LF     Mode of Treatment individual therapy;occupational therapy  -LF     Row Name 10/17/22 1345          General Information    Patient Profile Reviewed yes  -LF     Prior Level of Function --  (I) with ADLs and ambulated w/o a device. Homeless in [place] during the warmer months and lives with his sister in [place] during cold weather. He uses a step over tub where he stands to shower, standard commode, stands to groom, and no home O2.  -LF     Existing Precautions/Restrictions no known precautions/restrictions  -     Barriers to Rehab none identified  -LF     Row Name 10/17/22 1345          Occupational Profile    Reason for Services/Referral (Occupational Profile) Patient is a 62 year old male who is currently status post right above the knee amputation on 2022. Occupational therapy consulted due to recent decline in ADLs/functional  transfers. No previous occupational therapy services for current condition.  -     Row Name 10/17/22 1345          Living Environment    People in Home sibling(s);other relative(s)  Patient is staying with his sister, her daughter, and her 3 grandchildren.  -     Row Name 10/17/22 1345          Home Main Entrance    Number of Stairs, Main Entrance none  -     Row Name 10/17/22 1345          Stairs Within Home, Primary    Number of Stairs, Within Home, Primary none  -     Row Name 10/17/22 1345          Cognition    Orientation Status (Cognition) oriented x 4  -     Row Name 10/17/22 1345          Safety Issues, Functional Mobility    Safety Issues Affecting Function (Mobility) awareness of need for assistance;insight into deficits/self-awareness  -     Impairments Affecting Function (Mobility) balance;endurance/activity tolerance;pain;strength  -           User Key  (r) = Recorded By, (t) = Taken By, (c) = Cosigned By    Initials Name Provider Type     Clarissa Owens OT Occupational Therapist                 Mobility/ADL's     Row Name 10/17/22 1350          Bed Mobility    Bed Mobility sit-supine  -     Sit-Supine [place] (Bed Mobility) standby assist  -     Bed Mobility, Safety Issues decreased use of legs for bridging/pushing  -     Comment, (Bed Mobility) Patient upright and seated in recliner upon therapist arrival.  -     Row Name 10/17/22 1350          Transfers    Transfers sit-stand transfer;stand-sit transfer  -     Comment, (Transfers) Standing pivot from recliner to EOB.  -     Row Name 10/17/22 1350          Sit-Stand Transfer    Sit-Stand [place] (Transfers) minimum assist (75% patient effort)  -     Assistive Device (Sit-Stand Transfers) other (see comments)  handheld  -     Row Name 10/17/22 1350          Stand-Sit Transfer    Stand-Sit [place] (Transfers) minimum assist (75% patient effort)  -     Assistive Device (Stand-Sit Transfers) other (see  comments)  handheld  -     Row Name 10/17/22 1350          Activities of Daily Living    BADL Assessment/Intervention bathing;upper body dressing;lower body dressing;grooming;feeding;toileting  -     Row Name 10/17/22 1350          Bathing Assessment/Intervention    [place] Level (Bathing) bathing skills;upper body;set up;lower body;moderate assist (50% patient effort)  -     Row Name 10/17/22 1350          Upper Body Dressing Assessment/Training    [place] Level (Upper Body Dressing) upper body dressing skills;set up  -     Row Name 10/17/22 135          Lower Body Dressing Assessment/Training    [place] Level (Lower Body Dressing) lower body dressing skills;moderate assist (50% patient effort)  -     Row Name 10/17/22 1350          Grooming Assessment/Training    [place] Level (Grooming) grooming skills;set up  -[facility] Name 10/17/22 1350          Self-Feeding Assessment/Training    [place] Level (Feeding) feeding skills;set up  -     Row Name 10/17/22 135          Toileting Assessment/Training    [place] Level (Toileting) toileting skills;moderate assist (50% patient effort)  -           User Key  (r) = Recorded By, (t) = Taken By, (c) = Cosigned By    Initials Name Provider Type     Clarissa Owens OT Occupational Therapist               Obj/Interventions     [facility] Name 10/17/22 1351          Sensory Assessment (Somatosensory)    Sensory Assessment (Somatosensory) UE sensation intact  -[facility] Name 10/17/22 1351          Vision Assessment/Intervention    Visual Impairment/Limitations WFL;corrective lenses full-time  -[facility] Name 10/17/22 1351          Range of Motion Comprehensive    General Range of Motion bilateral upper extremity ROM WFL  -     Row Name 10/17/22 1351          Strength Comprehensive (MMT)    Comment, General Manual Muscle Testing (MMT) Assessment 5/5 BUEs  -[facility] Name 10/17/22 1351          Motor Skills    Motor Skills  coordination;functional endurance  -LF     Coordination WFL  -LF     Functional Endurance Fair  -LF     Row Name 10/17/22 [facility]          Balance    Balance Assessment sitting dynamic balance;standing dynamic balance  -LF     Dynamic Sitting Balance standby assist;supervision  -LF     Position, Sitting Balance unsupported;sitting edge of bed  -LF     Dynamic Standing Balance minimal assist  -LF     Position/Device Used, Standing Balance supported;other (see comments)  handheld  -LF           User Key  (r) = Recorded By, (t) = Taken By, (c) = Cosigned By    Initials Name Provider Type     Clarissa Owens OT Occupational Therapist               Goals/Plan     Row Name 10/17/22 [facility]4          Bed Mobility Goal 1 (OT)    Activity/Assistive Device (Bed Mobility Goal 1, OT) bed mobility activities, all  -LF     [place] Level/Cues Needed (Bed Mobility Goal 1, OT) modified independence  -LF     Time Frame (Bed Mobility Goal 1, OT) long term goal (LTG);10 days  -     Row Name 10/17/22 [facility]          Transfer Goal 1 (OT)    Activity/Assistive Device (Transfer Goal 1, OT) transfers, all;walker, rolling  -LF     [place] Level/Cues Needed (Transfer Goal 1, OT) modified independence  -LF     Time Frame (Transfer Goal 1, OT) long term goal (LTG);10 days  -LF     Row Name 10/17/22 [facility]          Bathing Goal 1 (OT)    Activity/Device (Bathing Goal 1, OT) bathing skills, all  -LF     [place] Level/Cues Needed (Bathing Goal 1, OT) standby assist  -LF     Time Frame (Bathing Goal 1, OT) long term goal (LTG);10 days  -LF     Row Name 10/17/22 [facility]          Dressing Goal 1 (OT)    Activity/Device (Dressing Goal 1, OT) dressing skills, all  -LF     [place]/Cues Needed (Dressing Goal 1, OT) standby assist  -LF     Time Frame (Dressing Goal 1, OT) long term goal (LTG);10 days  -LF     Row Name 10/17/22 [facility]          Toileting Goal 1 (OT)    Activity/Device (Toileting Goal 1, OT) toileting skills, all  -LF      [place] Level/Cues Needed (Toileting Goal 1, OT) standby assist  -     Time Frame (Toileting Goal 1, OT) long term goal (LTG);10 days  -     Row Name 10/17/22 1063          Problem Specific Goal 1 (OT)    Problem Specific Goal 1 (OT) Patient will demonstrate fair+ endurance to support ADLs/functional transfers.  -     Time Frame (Problem Specific Goal 1, OT) long term goal (LTG);10 days  -     Row Name 10/17/22 9424          Therapy Assessment/Plan (OT)    Planned Therapy Interventions (OT) activity tolerance training;patient/caregiver education/training;BADL retraining;functional balance retraining;occupation/activity based interventions;transfer/mobility retraining  -           User Key  (r) = Recorded By, (t) = Taken By, (c) = Cosigned By    Initials Name Provider Type     Clarissa Owens OT Occupational Therapist               Clinical Impression     Row Name 10/17/22 7242          Pain Assessment    Pretreatment Pain Rating 8/10  -LF     Posttreatment Pain Rating 8/10  -     Pain Intervention(s) Nursing Notified  -     Row Name 10/17/22 [facility]2          Plan of Care Review    Plan of Care Reviewed With patient;sibling  -     Progress no change  -     Outcome Evaluation Patient presents with limitations in self-care, functional transfers, balance, and endurance. He would benefit from continued skilled occupational therapy services to maximize independence with ADLs/functional transfers. Inpatient rehab facility or home with home health therapy services recommended upon discharge from hospital.  -     Row Name 10/17/22 4606          Therapy Assessment/Plan (OT)    Patient/Family Therapy Goal Statement (OT) To maximize independence.  -     Rehab Potential (OT) good, to achieve stated therapy goals  -     Criteria for Skilled Therapeutic Interventions Met (OT) yes;skilled treatment is necessary;no problems identified which require skilled intervention  -     Therapy Frequency (OT) 5  times/wk  -     Row Name 10/17/22 1359          Therapy Plan Review/Discharge Plan (OT)    Equipment Needs Upon Discharge (OT) walker, rolling;commode chair;tub bench  -     Anticipated Discharge Disposition (OT) inpatient rehabilitation facility;home with home health;home with assist  -     Row Name 10/17/22 1352          Vital Signs    O2 Delivery Pre Treatment room air  -LF     O2 Delivery Intra Treatment room air  -LF     O2 Delivery Post Treatment room air  -LF           User Key  (r) = Recorded By, (t) = Taken By, (c) = Cosigned By    Initials Name Provider Type     Clarissa Owens, CRISTAL Occupational Therapist               Outcome Measures     Row Name 10/17/22 3051          How much help from another is currently needed...    Putting on and taking off regular lower body clothing? 2  -LF     Bathing (including washing, rinsing, and drying) 2  -LF     Toileting (which includes using toilet bed pan or urinal) 2  -LF     Putting on and taking off regular upper body clothing 4  -LF     Taking care of personal grooming (such as brushing teeth) 4  -LF     Eating meals 4  -LF     AM-PAC 6 Clicks Score (OT) 18  -     Row Name 10/17/22 0846          How much help from another person do you currently need...    Turning from your back to your side while in flat bed without using bedrails? 4  -JR     Moving from lying on back to sitting on the side of a flat bed without bedrails? 3  -JR     Moving to and from a bed to a chair (including a wheelchair)? 1  -JR     Standing up from a chair using your arms (e.g., wheelchair, bedside chair)? 1  -JR     Climbing 3-5 steps with a railing? 1  -JR     To walk in hospital room? 1  -JR     AM-PAC 6 Clicks Score (PT) 11  -JR     Highest level of mobility 4 --> Transferred to chair/commode  -JR     Row Name 10/17/22 5163          Functional Assessment    Outcome Measure Options AM-PAC 6 Clicks Daily Activity (OT);Optimal Instrument  -     Row Name 10/17/22 7649           Optimal Instrument    Optimal Instrument Optimal - 3  -LF     Bending/Stooping 3  -LF     Standing 2  -LF     Reaching 1  -LF     From the list, choose the 3 activities you would most like to be able to do without any difficulty Bending/stooping;Standing;Reaching  -LF     Total Score Optimal - 3 6  -LF           User Key  (r) = Recorded By, (t) = Taken By, (c) = Cosigned By    Initials Name Provider Type     Clarissa Owens OT Occupational Therapist    Doreen Strong RNA Registered Nurse                Occupational Therapy Education     Title: PT OT SLP Therapies (In Progress)     Topic: Occupational Therapy (In Progress)     Point: ADL training (In Progress)     Description:   Instruct learner(s) on proper safety adaptation and remediation techniques during self care or transfers.   Instruct in proper use of assistive devices.              Learning Progress Summary           Patient Acceptance, E,TB, NR by  at 10/17/2022 1357                   Point: Precautions (In Progress)     Description:   Instruct learner(s) on prescribed precautions during self-care and functional transfers.              Learning Progress Summary           Patient Acceptance, E,TB, NR by  at 10/17/2022 1357                   Point: Body mechanics (In Progress)     Description:   Instruct learner(s) on proper positioning and spine alignment during self-care, functional mobility activities and/or exercises.              Learning Progress Summary           Patient Acceptance, E,TB, NR by  at 10/17/2022 1357                               User Key     Initials Effective Dates Name Provider Type [facility] 06/16/21 -  Clarissa Owens OT Occupational Therapist OT              OT Recommendation and Plan  Planned Therapy Interventions (OT): activity tolerance training, patient/caregiver education/training, BADL retraining, functional balance retraining, occupation/activity based interventions, transfer/mobility  retraining  Therapy Frequency (OT): 5 times/wk  Plan of Care Review  Plan of Care Reviewed With: patient, sibling  Progress: no change  Outcome Evaluation: Patient presents with limitations in self-care, functional transfers, balance, and endurance. He would benefit from continued skilled occupational therapy services to maximize independence with ADLs/functional transfers. Inpatient rehab facility or home with home health therapy services recommended upon discharge from hospital.     Time Calculation:    Time Calculation- OT     Row Name 10/17/22 1358             Time Calculation- OT    OT Received On 10/17/22  -LF      OT Goal Re-Cert Due Date 10/26/22  -LF         Untimed Charges    OT Eval/Re-eval Minutes 35  -LF         Total Minutes    Untimed Charges Total Minutes 35  -LF       Total Minutes 35  -LF            User Key  (r) = Recorded By, (t) = Taken By, (c) = Cosigned By    Initials Name Provider Type    LF Clarissa Owens OT Occupational Therapist              Therapy Charges for Today     Code Description Service Date Service Provider Modifiers Qty    [id number] HC OT EVAL LOW COMPLEXITY 3 10/17/2022 Clarissa Owens OT GO 1               Clarissa Owens OT  10/17/2022

## 2022-10-18 PROBLEM — E83.39 HYPOPHOSPHATEMIA: Status: ACTIVE | Noted: 2022-10-18

## 2022-10-18 PROBLEM — Z89.611 S/P AKA (ABOVE KNEE AMPUTATION), RIGHT: Status: ACTIVE | Noted: 2022-10-18

## 2022-10-18 PROBLEM — E87.1 HYPONATREMIA: Status: ACTIVE | Noted: 2022-10-18

## 2022-10-18 PROBLEM — E83.39 HYPOPHOSPHATEMIA: Status: RESOLVED | Noted: 2022-10-18 | Resolved: 2022-10-18

## 2022-10-18 PROBLEM — R74.8 ELEVATED ALKALINE PHOSPHATASE LEVEL: Status: ACTIVE | Noted: 2022-10-18

## 2022-10-18 PROBLEM — D50.9 IRON DEFICIENCY ANEMIA: Status: ACTIVE | Noted: 2022-10-18

## 2022-10-18 PROBLEM — E87.6 HYPOKALEMIA: Status: RESOLVED | Noted: 2022-10-18 | Resolved: 2022-10-18

## 2022-10-18 PROBLEM — E88.09 HYPOALBUMINEMIA: Status: ACTIVE | Noted: 2022-10-18

## 2022-10-18 PROBLEM — I73.9 PVD (PERIPHERAL VASCULAR DISEASE): Status: ACTIVE | Noted: 2022-10-18

## 2022-10-18 PROBLEM — A48.0 GAS GANGRENE: Status: RESOLVED | Noted: 2022-10-12 | Resolved: 2022-10-18

## 2022-10-18 PROBLEM — Z72.0 TOBACCO USE: Status: ACTIVE | Noted: 2022-10-18

## 2022-10-18 PROBLEM — E83.42 HYPOMAGNESEMIA: Status: ACTIVE | Noted: 2022-10-18

## 2022-10-18 PROBLEM — R74.01 TRANSAMINITIS: Status: ACTIVE | Noted: 2022-10-18

## 2022-10-18 PROBLEM — E83.42 HYPOMAGNESEMIA: Status: RESOLVED | Noted: 2022-10-18 | Resolved: 2022-10-18

## 2022-10-18 PROBLEM — F41.9 ANXIETY: Status: ACTIVE | Noted: 2022-10-18

## 2022-10-18 PROBLEM — R64 CACHEXIA: Status: ACTIVE | Noted: 2022-10-18

## 2022-10-18 PROBLEM — A41.9 SEPSIS: Status: RESOLVED | Noted: 2022-10-11 | Resolved: 2022-10-18

## 2022-10-18 PROBLEM — E87.6 HYPOKALEMIA: Status: ACTIVE | Noted: 2022-10-18

## 2022-10-18 LAB
ALBUMIN SERPL-MCNC: 2.5 G/DL (ref 3.5–5.2)
ALBUMIN/GLOB SERPL: 0.6 G/DL
ALP SERPL-CCNC: 215 U/L (ref 39–117)
ALT SERPL W P-5'-P-CCNC: 64 U/L (ref 1–41)
ANION GAP SERPL CALCULATED.3IONS-SCNC: 8 MMOL/L (ref 5–15)
AST SERPL-CCNC: 43 U/L (ref 1–40)
BILIRUB SERPL-MCNC: 0.2 MG/DL (ref 0–1.2)
BUN SERPL-MCNC: 8 MG/DL (ref 8–23)
BUN/CREAT SERPL: 20 (ref 7–25)
CALCIUM SPEC-SCNC: 9 MG/DL (ref 8.6–10.5)
CHLORIDE SERPL-SCNC: 94 MMOL/L (ref 98–107)
CO2 SERPL-SCNC: 24 MMOL/L (ref 22–29)
CREAT SERPL-MCNC: 0.4 MG/DL (ref 0.76–1.27)
CYTO UR: NORMAL
DEPRECATED RDW RBC AUTO: 52.2 FL (ref 37–54)
EGFRCR SERPLBLD CKD-EPI 2021: 122.6 ML/MIN/1.73
ERYTHROCYTE [DISTWIDTH] IN BLOOD BY AUTOMATED COUNT: 16 % (ref 12.3–15.4)
GLOBULIN UR ELPH-MCNC: 3.9 GM/DL
GLUCOSE SERPL-MCNC: 110 MG/DL (ref 65–99)
HCT VFR BLD AUTO: 24 % (ref 37.5–51)
HGB BLD-MCNC: 8.1 G/DL (ref 13–17.7)
LAB AP CASE REPORT: NORMAL
LAB AP CLINICAL INFORMATION: NORMAL
MCH RBC QN AUTO: 30.3 PG (ref 26.6–33)
MCHC RBC AUTO-ENTMCNC: 33.8 G/DL (ref 31.5–35.7)
MCV RBC AUTO: 89.9 FL (ref 79–97)
PATH REPORT.FINAL DX SPEC: NORMAL
PATH REPORT.GROSS SPEC: NORMAL
PLATELET # BLD AUTO: 637 10*3/MM3 (ref 140–450)
PMV BLD AUTO: 8.1 FL (ref 6–12)
POTASSIUM SERPL-SCNC: 4.2 MMOL/L (ref 3.5–5.2)
PROT SERPL-MCNC: 6.4 G/DL (ref 6–8.5)
RBC # BLD AUTO: 2.67 10*6/MM3 (ref 4.14–5.8)
SODIUM SERPL-SCNC: 126 MMOL/L (ref 136–145)
WBC NRBC COR # BLD: 9.21 10*3/MM3 (ref 3.4–10.8)

## 2022-10-18 PROCEDURE — 99233 SBSQ HOSP IP/OBS HIGH 50: CPT | Performed by: INTERNAL MEDICINE

## 2022-10-18 PROCEDURE — 85027 COMPLETE CBC AUTOMATED: CPT | Performed by: INTERNAL MEDICINE

## 2022-10-18 PROCEDURE — 25010000002 ENOXAPARIN PER 10 MG: Performed by: INTERNAL MEDICINE

## 2022-10-18 PROCEDURE — 80053 COMPREHEN METABOLIC PANEL: CPT | Performed by: INTERNAL MEDICINE

## 2022-10-18 RX ORDER — FERROUS SULFATE 325(65) MG
325 TABLET ORAL
Status: DISCONTINUED | OUTPATIENT
Start: 2022-10-19 | End: 2022-10-20 | Stop reason: HOSPADM

## 2022-10-18 RX ORDER — HYDROXYZINE HYDROCHLORIDE 25 MG/1
25 TABLET, FILM COATED ORAL 3 TIMES DAILY PRN
Status: DISCONTINUED | OUTPATIENT
Start: 2022-10-18 | End: 2022-10-20 | Stop reason: HOSPADM

## 2022-10-18 RX ADMIN — HYDROCODONE BITARTRATE AND ACETAMINOPHEN 1 TABLET: 10; 325 TABLET ORAL at 08:12

## 2022-10-18 RX ADMIN — HYDROXYZINE HYDROCHLORIDE 25 MG: 25 TABLET, FILM COATED ORAL at 21:53

## 2022-10-18 RX ADMIN — Medication 10 ML: at 08:12

## 2022-10-18 RX ADMIN — HYDROXYZINE HYDROCHLORIDE 25 MG: 25 TABLET, FILM COATED ORAL at 12:05

## 2022-10-18 RX ADMIN — SENNOSIDES AND DOCUSATE SODIUM 2 TABLET: 8.6; 5 TABLET ORAL at 21:53

## 2022-10-18 RX ADMIN — HYDROCODONE BITARTRATE AND ACETAMINOPHEN 1 TABLET: 10; 325 TABLET ORAL at 21:53

## 2022-10-18 RX ADMIN — ENOXAPARIN SODIUM 40 MG: 100 INJECTION SUBCUTANEOUS at 14:24

## 2022-10-18 RX ADMIN — HYDROCODONE BITARTRATE AND ACETAMINOPHEN 1 TABLET: 10; 325 TABLET ORAL at 14:24

## 2022-10-18 RX ADMIN — HYDROCODONE BITARTRATE AND ACETAMINOPHEN 1 TABLET: 10; 325 TABLET ORAL at 03:35

## 2022-10-18 RX ADMIN — Medication 10 ML: at 21:54

## 2022-10-18 NOTE — PROGRESS NOTES
[facility]   Hospitalist Progress Note  Date: 10/18/2022  Patient Name: Uche Pereyra  : 1960  MRN: [id number]  Date of admission: 10/11/2022      Subjective   Subjective     Chief Complaint: Follow-up for right leg pain    Summary: 63 y/o M who presented with right leg pain/swelling x2 weeks.  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  Vascular surgery consulted.  Underwent right above-the-knee amputation on 10/13.  Source control obtained, off antibiotics.  APS involved due to homelessness.  Pending rehab and likely long-term placement.    Interval Followup: No acute events overnight.  No fevers.  Blood pressure controlled.  Doing well this morning.  No acute complaints aside from some mild anxiety in regards to his medical conditions and recent surgery.     Review of Systems  Constitutional:  No Fever, No Chills  Respiratory: Denied complaints  Gastrointestinal: Denied complaints  Musculoskeletal: Denied complaints  Psych: + Mild anxiety otherwise did not complain    Objective   Objective     Vitals:   Temp:  [97.6 °F (36.4 °C)-98 °F (36.7 °C)] 97.9 °F (36.6 °C)  Heart Rate:  [] 112  Resp:  [18] 18  BP: (100-111)/(58-69) 100/62  Physical Exam    Constitutional:  male.  Cachectic.  Conversant, NAD   Eyes: Pupils equal and reactive, no conjunctival injection   HENT: NCAT, MMM   Neck: Supple, trachea midline   Respiratory: CTAB.  No wheezing   Cardiovascular: RRR, no murmurs, no pedal edema   Gastrointestinal: Positive bowel sounds, soft, nontender, nondistended   Musculoskeletal: Right AKA with Ace wrap in place   Neurologic: Oriented x 3, Cranial Nerves grossly intact speech clear   Skin: Warm and dry.     Result Review    Result Review:  I have personally reviewed the following over the last 24 hours (07:00 to 07:00) and agree with the following findings  [x]  Laboratory   CBC    CBC 10/16/22 10/17/22 10/18/22   WBC 9.97 8.18 9.21    RBC 2.82 (A) 2.81 (A) 2.67 (A)   Hemoglobin 8.6 (A) 8.4 (A) 8.1 (A)   Hematocrit 25.2 (A) 25.0 (A) 24.0 (A)   MCV 89.4 89.0 89.9   MCH 30.5 29.9 30.3   MCHC 34.1 33.6 33.8   RDW 15.7 (A) 15.9 (A) 16.0 (A)   Platelets 583 (A) 612 (A) 637 (A)   (A) Abnormal value            CMP    CMP 10/16/22 10/17/22 10/18/22   Glucose 113 (A) 110 (A) 110 (A)   BUN 5 (A) 5 (A) 8   Creatinine 0.40 (A) 0.42 (A) 0.40 (A)   Sodium 127 (A) 129 (A) 126 (A)   Potassium 3.9 3.7 4.2   Chloride 90 (A) 93 (A) 94 (A)   Calcium 9.1 9.2 9.0   Albumin 2.60 (A) 2.70 (A) 2.50 (A)   Total Bilirubin 0.4 0.4 0.2   Alkaline Phosphatase 236 (A) 213 (A) 215 (A)   AST (SGOT) 75 (A) 45 (A) 43 (A)   ALT (SGPT) 89 (A) 67 (A) 64 (A)   (A) Abnormal value              [x]  Microbiology blood cultures NGTD  []  Radiology  []  EKG/Telemetry   []  Cardiology/Vascular   []  Pathology  [x]  Old records previous progress note  []  Other:    Assessment & Plan   Assessment / Plan     Assessment/Plan:  Active Hospital Problems    Diagnosis  POA   • **Transaminitis [R74.01]  Yes   • Iron deficiency anemia [D50.9]  Yes   • PVD (peripheral vascular disease) (HCC) [I73.9]  Yes   • Hyponatremia [E87.1]  Yes   • Hypoalbuminemia [E88.09]  Yes   • Cachexia (HCC) [R64]  Yes   • S/P AKA (above knee amputation), right (HCC) [Z89.611]  Not Applicable   • Elevated alkaline phosphatase level [R74.8]  Unknown   • Tobacco use [Z72.0]  Unknown   • Anxiety [F41.9]  Unknown   • Severe malnutrition (HCC) [E43]  Yes   • 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  We will arrange follow-up at time of discharge. Continue daily dressing changes per their instructions    Continue as needed Norco regimen per orders. Continue bowel regimen    Still hyponatremic.   Sodium level a little worse today.  Appears asymptomatic.  Euvolemic.  Urine studies consistent more with SIADH picture.  TSH within normal limits.  No medication culprits.  No history of adrenal issues.  Will check a.m. cortisol to complete work-up.  Placed on 1.5 L fluid restriction.     Liver enzymes continue to improve.  Monitor with daily CMP    S/p IV iron. Add oral ferrous sulfate 325 mg daily.  Will need repeat iron profile in 3 months.      Continue Lovenox 40 mg daily for DVT prophylaxis  Continue Ensure supplements three times daily  Continue PT/OT  Disposition: [facility].  Precertification pending    Discussed plan with RN.    DVT prophylaxis:  Medical DVT prophylaxis orders are present.    CODE STATUS:   Code Status (Patient has no pulse and is not breathing): CPR (Attempt to Resuscitate)  Medical Interventions (Patient has pulse or is breathing): Full Support      Electronically signed by Greg Lerma DO, 10/18/22, 8:42 AM EDT.

## 2022-10-18 NOTE — PLAN OF CARE
Goal Outcome Evaluation:         Patient with more complaints of soreness and discomfort today. MD Lerma aware. Sister at bedside this afternoon. No acute changes with patient. Bed in lowest locked position with call light and tray table within reach

## 2022-10-18 NOTE — PLAN OF CARE
Goal Outcome Evaluation:      No events overnight. Medicated for pain with PO norco as ordered. VSS. No other complaints this shift. Cecilia Mayo RN

## 2022-10-19 LAB
ALBUMIN SERPL-MCNC: 2.8 G/DL (ref 3.5–5.2)
ALBUMIN/GLOB SERPL: 0.7 G/DL
ALP SERPL-CCNC: 219 U/L (ref 39–117)
ALT SERPL W P-5'-P-CCNC: 63 U/L (ref 1–41)
ANION GAP SERPL CALCULATED.3IONS-SCNC: 9 MMOL/L (ref 5–15)
AST SERPL-CCNC: 37 U/L (ref 1–40)
BILIRUB SERPL-MCNC: 0.2 MG/DL (ref 0–1.2)
BUN SERPL-MCNC: 8 MG/DL (ref 8–23)
BUN/CREAT SERPL: 19.5 (ref 7–25)
CALCIUM SPEC-SCNC: 9.1 MG/DL (ref 8.6–10.5)
CHLORIDE SERPL-SCNC: 93 MMOL/L (ref 98–107)
CO2 SERPL-SCNC: 26 MMOL/L (ref 22–29)
CORTIS SERPL-MCNC: 20.61 MCG/DL
CREAT SERPL-MCNC: 0.41 MG/DL (ref 0.76–1.27)
DEPRECATED RDW RBC AUTO: 54.4 FL (ref 37–54)
EGFRCR SERPLBLD CKD-EPI 2021: 121.7 ML/MIN/1.73
ERYTHROCYTE [DISTWIDTH] IN BLOOD BY AUTOMATED COUNT: 16.5 % (ref 12.3–15.4)
GLOBULIN UR ELPH-MCNC: 3.9 GM/DL
GLUCOSE SERPL-MCNC: 109 MG/DL (ref 65–99)
HCT VFR BLD AUTO: 25.5 % (ref 37.5–51)
HGB BLD-MCNC: 8.3 G/DL (ref 13–17.7)
MCH RBC QN AUTO: 30.1 PG (ref 26.6–33)
MCHC RBC AUTO-ENTMCNC: 32.5 G/DL (ref 31.5–35.7)
MCV RBC AUTO: 92.4 FL (ref 79–97)
PLATELET # BLD AUTO: 639 10*3/MM3 (ref 140–450)
PMV BLD AUTO: 7.9 FL (ref 6–12)
POTASSIUM SERPL-SCNC: 4.5 MMOL/L (ref 3.5–5.2)
PROT SERPL-MCNC: 6.7 G/DL (ref 6–8.5)
RBC # BLD AUTO: 2.76 10*6/MM3 (ref 4.14–5.8)
SODIUM SERPL-SCNC: 128 MMOL/L (ref 136–145)
WBC NRBC COR # BLD: 12.19 10*3/MM3 (ref 3.4–10.8)

## 2022-10-19 PROCEDURE — 80053 COMPREHEN METABOLIC PANEL: CPT | Performed by: INTERNAL MEDICINE

## 2022-10-19 PROCEDURE — 85027 COMPLETE CBC AUTOMATED: CPT | Performed by: INTERNAL MEDICINE

## 2022-10-19 PROCEDURE — 82533 TOTAL CORTISOL: CPT | Performed by: INTERNAL MEDICINE

## 2022-10-19 PROCEDURE — 25010000002 ENOXAPARIN PER 10 MG: Performed by: INTERNAL MEDICINE

## 2022-10-19 PROCEDURE — 99232 SBSQ HOSP IP/OBS MODERATE 35: CPT | Performed by: INTERNAL MEDICINE

## 2022-10-19 RX ADMIN — HYDROXYZINE HYDROCHLORIDE 25 MG: 25 TABLET, FILM COATED ORAL at 20:51

## 2022-10-19 RX ADMIN — ENOXAPARIN SODIUM 40 MG: 100 INJECTION SUBCUTANEOUS at 15:43

## 2022-10-19 RX ADMIN — HYDROCODONE BITARTRATE AND ACETAMINOPHEN 1 TABLET: 5; 325 TABLET ORAL at 10:12

## 2022-10-19 RX ADMIN — SENNOSIDES AND DOCUSATE SODIUM 2 TABLET: 8.6; 5 TABLET ORAL at 20:51

## 2022-10-19 RX ADMIN — Medication 10 ML: at 20:51

## 2022-10-19 RX ADMIN — SENNOSIDES AND DOCUSATE SODIUM 2 TABLET: 8.6; 5 TABLET ORAL at 08:07

## 2022-10-19 RX ADMIN — HYDROCODONE BITARTRATE AND ACETAMINOPHEN 1 TABLET: 10; 325 TABLET ORAL at 22:53

## 2022-10-19 RX ADMIN — Medication 10 ML: at 08:08

## 2022-10-19 RX ADMIN — FERROUS SULFATE TAB 325 MG (65 MG ELEMENTAL FE) 325 MG: 325 (65 FE) TAB at 08:07

## 2022-10-19 RX ADMIN — HYDROCODONE BITARTRATE AND ACETAMINOPHEN 1 TABLET: 5; 325 TABLET ORAL at 16:22

## 2022-10-19 RX ADMIN — HYDROCODONE BITARTRATE AND ACETAMINOPHEN 1 TABLET: 5; 325 TABLET ORAL at 10:10

## 2022-10-19 NOTE — PLAN OF CARE
Goal Outcome Evaluation:         VSS, pt A&Ox4, pain controlled with norco x1, Atarax started PRN, fluid restrictions. Will continue to monitor.

## 2022-10-19 NOTE — SIGNIFICANT NOTE
10/19/22 1437   Plan   Plan Precert was approved at [facility]. HENRY discussed with MD, patient to discharge to facility tomorrow 10/20.   Final Discharge Disposition Code 62 - inpatient rehab facility

## 2022-10-19 NOTE — PROGRESS NOTES
[facility]   Hospitalist Progress Note  Date: 10/19/2022  Patient Name: Uche Pereyra  : 1960  MRN: [id number]  Date of admission: 10/11/2022      Subjective   Subjective     Chief Complaint: Follow-up for right leg pain    Summary: 63 y/o M who presented with right leg pain/swelling x2 weeks.  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  Vascular surgery consulted.  Underwent right above-the-knee amputation on 10/13.  Source control obtained, off antibiotics.  APS involved due to homelessness.  Pending rehab and likely long-term placement.    Interval Followup: No acute events overnight.  Vital stable.  Feeling well this morning.  No acute complaints.  Feels Atarax help with anxiety.  Denies uncontrolled pain.  Tolerating oral intake.  No RN issues reported    Review of Systems  Constitutional: Denied complaint  Respiratory: Denied complaints  Gastrointestinal: Denied complaints  Musculoskeletal: Denied complaints  Psych: Anxiety, improved    Objective   Objective     Vitals:   Temp:  [97.2 °F (36.2 °C)-98.3 °F (36.8 °C)] 97.9 °F (36.6 °C)  Heart Rate:  [] 115  Resp:  [18] 18  BP: (101-107)/(53-63) 101/53  Physical Exam    Constitutional:  male.  Cachectic.  Conversant, NAD   Eyes: Pupils equal and reactive, no conjunctival injection   HENT: NCAT, MMM   Neck: Supple, trachea midline   Respiratory: CTAB.  No wheezing   Cardiovascular: RRR, no murmurs, no pedal edema   Gastrointestinal: Positive bowel sounds, soft, nontender, nondistended   Musculoskeletal: Right AKA with Ace wrap in place   Neurologic: Oriented x 3, Cranial Nerves grossly intact speech clear   Skin: Warm and dry.     Result Review    Result Review:  I have personally reviewed the following over the last 24 hours (07:00 to 07:00) and agree with the following findings  [x]  Laboratory   CBC    CBC 10/17/22 10/18/22 10/19/22   WBC 8.18 9.21 12.19 (A)   RBC 2.81 (A) 2.67 (A)  2.76 (A)   Hemoglobin 8.4 (A) 8.1 (A) 8.3 (A)   Hematocrit 25.0 (A) 24.0 (A) 25.5 (A)   MCV 89.0 89.9 92.4   MCH 29.9 30.3 30.1   MCHC 33.6 33.8 32.5   RDW 15.9 (A) 16.0 (A) 16.5 (A)   Platelets 612 (A) 637 (A) 639 (A)   (A) Abnormal value            CMP    CMP 10/17/22 10/18/22 10/19/22   Glucose 110 (A) 110 (A) 109 (A)   BUN 5 (A) 8 8   Creatinine 0.42 (A) 0.40 (A) 0.41 (A)   Sodium 129 (A) 126 (A) 128 (A)   Potassium 3.7 4.2 4.5   Chloride 93 (A) 94 (A) 93 (A)   Calcium 9.2 9.0 9.1   Albumin 2.70 (A) 2.50 (A) 2.80 (A)   Total Bilirubin 0.4 0.2 0.2   Alkaline Phosphatase 213 (A) 215 (A) 219 (A)   AST (SGOT) 45 (A) 43 (A) 37   ALT (SGPT) 67 (A) 64 (A) 63 (A)   (A) Abnormal value              [x]  Microbiology blood cultures NGTD  []  Radiology  []  EKG/Telemetry   []  Cardiology/Vascular   []  Pathology  []  Old records  []  Other:    Assessment & Plan   Assessment / Plan     Assessment/Plan:  Active Hospital Problems    Diagnosis  POA   • **Transaminitis [R74.01]  Yes   • Iron deficiency anemia [D50.9]  Yes   • PVD (peripheral vascular disease) (HCC) [I73.9]  Yes   • Hyponatremia [E87.1]  Yes   • Hypoalbuminemia [E88.09]  Yes   • Cachexia (HCC) [R64]  Yes   • S/P AKA (above knee amputation), right (HCC) [Z89.611]  Not Applicable   • Elevated alkaline phosphatase level [R74.8]  Unknown   • Tobacco use [Z72.0]  Unknown   • Anxiety [F41.9]  Unknown   • Severe malnutrition (HCC) [E43]  Yes   • 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  Sodium level improving. Continue 1.5 L fluid restriction.   Would like to see sodium level stable/uptrend on consecutive days before discharge. Repeat BMP tomorrow.     Reports improvement in anxiety with Atarax, will continue TID as needed    Continue oral ferrous sulfate 325 mg daily.  Will need repeat iron profile in 3 months.      No further  recommendations from vascular surgery standpoint.  We will arrange follow-up at time of discharge. Continue daily dressing changes per their instructions    Continue as needed Norco regimen per orders. Continue bowel regimen    Continue Lovenox 40 mg daily for DVT prophylaxis  Continue Ensure supplements three times daily  Continue PT/OT  Disposition: Has bed at [facility]  Tentative discharge 10/20 if sodium stable/improved    Discussed plan with RNHENRY    DVT prophylaxis:  Medical DVT prophylaxis orders are present.    CODE STATUS:   Code Status (Patient has no pulse and is not breathing): CPR (Attempt to Resuscitate)  Medical Interventions (Patient has pulse or is breathing): Full Support      Electronically signed by Greg Lerma DO, 10/19/22, 12:44 PM EDT.

## 2022-10-19 NOTE — PLAN OF CARE
Problem: Skin Injury Risk Increased  Goal: Skin Health and Integrity  Outcome: Ongoing, Progressing  Intervention: Optimize Skin Protection  Recent Flowsheet Documentation  Taken 10/19/2022 1040 by Uche Courtney RN  Head of Bed (HOB) Positioning: HOB at 30 degrees     Problem: Fall Injury Risk  Goal: Absence of Fall and Fall-Related Injury  Outcome: Ongoing, Progressing  Intervention: Identify and Manage Contributors  Recent Flowsheet Documentation  Taken 10/19/2022 1040 by Uche Courtney RN  Medication Review/Management: medications reviewed  Intervention: Promote Injury-Free Environment  Recent Flowsheet Documentation  Taken 10/19/2022 1040 by Uche Courtney RN  Safety Promotion/Fall Prevention: safety round/check completed   Goal Outcome Evaluation:

## 2022-10-19 NOTE — SIGNIFICANT NOTE
Wound Eval / Progress Noted     [place]     Patient Name: Uche Pereyra  : 1960  MRN: [id number]  Today's Date: 10/19/2022                 Admit Date: 10/11/2022    Visit Dx:    ICD-10-CM ICD-9-CM   1. Sepsis, due to unspecified organism, unspecified whether acute organ dysfunction present (HCC)  A41.9 038.9     995.91   2. 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 Decreased activities of daily living (ADL)  Z78.9 V49.89   4. 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 No pertinent past medical history.     Past Surgical History:   Procedure Laterality Date   • ABOVE KNEE AMPUTATION Right 10/13/2022    Procedure: Right above-the-knee amputation;  Surgeon: Ghulam Whittington MD;  Location: [facility];  Service: Vascular;  Laterality: Right;         Physical Assessment:  Wound coccyx (Active)   Wound Image   10/19/22 100   Pressure Injury Stage 3 10/19/22 100   Dressing Appearance intact;dry 10/19/22 1005   Closure None 10/19/22 1005   Base moist;pink;slough;non-blanchable;dry 10/19/22 1005   Red (%), Wound Tissue Color 75 10/19/22 100   Yellow (%), Wound Tissue Color 25 10/19/22 100   Periwound intact;blanchable;dry;redness 10/19/22 1005   Periwound Temperature warm 10/19/22 1005   Periwound Skin Turgor soft 10/19/22 100   Edges open 10/19/22 1005   Wound Length (cm) 3.8 cm 10/19/22 100   Wound Width (cm) 1 cm 10/19/22 1005   Wound Surface Area (cm^2) 3.8 cm^2 10/19/22 100   Drainage Amount none 10/19/22 1005    Care, Wound cleansed with;sterile normal saline 10/19/22 1005   Dressing Care dressing applied;silver impregnated;hydrofiber;silicone;border dressing 10/19/22 1005   Periwound Care absorptive dressing applied 10/19/22 1005     Wound Check / Follow-up:  Patient seen today for a wound follow up. Patient with a stage III pressure injury to coccyx. Scattered open wounds. Tissue is pink and dry, some slough noted as well; periwound is dark red but blanchable. Applied silver impregnated hydrofiber to wound and secured with silicone border dressing.  Recommend to continue current wound care.    Patient requesting pain medication; spoke with Primary RN.    Impression: stage III pressure injuries to coccyx    Short term goals:  Regain skin integrity, daily dressing changes to coccyx    Siomara Prater RN    10/19/2022    10:42 EDT

## 2022-10-20 VITALS
HEART RATE: 110 BPM | HEIGHT: 70 IN | OXYGEN SATURATION: 97 % | DIASTOLIC BLOOD PRESSURE: 62 MMHG | WEIGHT: 103.62 LBS | SYSTOLIC BLOOD PRESSURE: 127 MMHG | TEMPERATURE: 98.6 F | RESPIRATION RATE: 14 BRPM | BODY MASS INDEX: 14.83 KG/M2

## 2022-10-20 PROBLEM — R74.01 TRANSAMINITIS: Status: RESOLVED | Noted: 2022-10-18 | Resolved: 2022-10-20

## 2022-10-20 LAB
ANION GAP SERPL CALCULATED.3IONS-SCNC: 10.2 MMOL/L (ref 5–15)
BUN SERPL-MCNC: 10 MG/DL (ref 8–23)
BUN/CREAT SERPL: 25.6 (ref 7–25)
CALCIUM SPEC-SCNC: 9 MG/DL (ref 8.6–10.5)
CHLORIDE SERPL-SCNC: 95 MMOL/L (ref 98–107)
CO2 SERPL-SCNC: 24.8 MMOL/L (ref 22–29)
CREAT SERPL-MCNC: 0.39 MG/DL (ref 0.76–1.27)
EGFRCR SERPLBLD CKD-EPI 2021: 123.5 ML/MIN/1.73
GLUCOSE SERPL-MCNC: 102 MG/DL (ref 65–99)
POTASSIUM SERPL-SCNC: 3.6 MMOL/L (ref 3.5–5.2)
SODIUM SERPL-SCNC: 130 MMOL/L (ref 136–145)

## 2022-10-20 PROCEDURE — 99239 HOSP IP/OBS DSCHRG MGMT >30: CPT | Performed by: INTERNAL MEDICINE

## 2022-10-20 PROCEDURE — 25010000002 MORPHINE PER 10 MG: Performed by: INTERNAL MEDICINE

## 2022-10-20 PROCEDURE — 80048 BASIC METABOLIC PNL TOTAL CA: CPT | Performed by: INTERNAL MEDICINE

## 2022-10-20 RX ORDER — ASPIRIN 81 MG/1
81 TABLET ORAL DAILY
Start: 2022-10-20

## 2022-10-20 RX ORDER — FERROUS SULFATE 325(65) MG
325 TABLET ORAL
Qty: 30 TABLET | Refills: 1 | Status: SHIPPED | OUTPATIENT
Start: 2022-10-20

## 2022-10-20 RX ORDER — HYDROCODONE BITARTRATE AND ACETAMINOPHEN 5; 325 MG/1; MG/1
1 TABLET ORAL EVERY 6 HOURS PRN
Qty: 12 TABLET | Refills: 0
Start: 2022-10-20 | End: 2022-10-23

## 2022-10-20 RX ORDER — HYDROXYZINE HYDROCHLORIDE 25 MG/1
25 TABLET, FILM COATED ORAL 3 TIMES DAILY PRN
Start: 2022-10-20

## 2022-10-20 RX ORDER — MORPHINE SULFATE 2 MG/ML
1 INJECTION, SOLUTION INTRAMUSCULAR; INTRAVENOUS ONCE AS NEEDED
Status: COMPLETED | OUTPATIENT
Start: 2022-10-20 | End: 2022-10-20

## 2022-10-20 RX ADMIN — MORPHINE SULFATE 1 MG: 2 INJECTION, SOLUTION INTRAMUSCULAR; INTRAVENOUS at 11:21

## 2022-10-20 RX ADMIN — SENNOSIDES AND DOCUSATE SODIUM 2 TABLET: 8.6; 5 TABLET ORAL at 08:38

## 2022-10-20 RX ADMIN — HYDROCODONE BITARTRATE AND ACETAMINOPHEN 1 TABLET: 10; 325 TABLET ORAL at 06:22

## 2022-10-20 RX ADMIN — HYDROXYZINE HYDROCHLORIDE 25 MG: 25 TABLET, FILM COATED ORAL at 10:24

## 2022-10-20 RX ADMIN — FERROUS SULFATE TAB 325 MG (65 MG ELEMENTAL FE) 325 MG: 325 (65 FE) TAB at 08:38

## 2022-10-20 RX ADMIN — Medication 10 ML: at 08:38

## 2022-10-20 RX ADMIN — HYDROCODONE BITARTRATE AND ACETAMINOPHEN 1 TABLET: 10; 325 TABLET ORAL at 14:41

## 2022-10-20 NOTE — DISCHARGE SUMMARY
[facility]         HOSPITALIST  DISCHARGE SUMMARY    Patient Name: Uche Pereyra  : 1960  MRN: [id number]    Date of Admission: 10/11/2022  Date of Discharge:  10/20/22  Primary Care Physician: Provider, No Known    Consults     Date and Time Order Name Status Description    10/12/2022 11:19 AM Inpatient Pulmonology Consult Completed           Active and Resolved Hospital Problems:  Active Hospital Problems    Diagnosis POA   • **S/P AKA (above knee amputation), right (HCC) [Z89.611] Not Applicable   • Iron deficiency anemia [D50.9] Yes   • PVD (peripheral vascular disease) (HCC) [I73.9] Yes   • Hyponatremia [E87.1] Yes   • Hypoalbuminemia [E88.09] Yes   • Cachexia (HCC) [R64] Yes   • Elevated alkaline phosphatase level [R74.8] Yes   • Tobacco use [Z72.0] Yes   • Anxiety [F41.9] Unknown   • Severe malnutrition (HCC) [E43] Yes   • 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 male  presented with right leg pain/swelling x2 weeks.  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    Infectious work-up obtained and started on broad-spectrum antibiotics. vascular surgery consulted.  Underwent right above-the-knee amputation on 10/13.  Source control obtained, cultures returned negative and antibiotics discontinued.  Cleared from a vascular surgery perspective.  Started on daily aspirin for peripheral vascular disease.  Anemia work-up, consistent with iron deficiency, started on iron replacement, will need repeat iron profile in several months and consideration for screening  colonoscopy given age.  Also had hyponatremia,  work-up consistent with SIADH picture, sodium levels gradually improved with fluid restriction.  Of note patient had difficult social situation due to homelessness and is visiting from out of town for several months.  Discussed importance of follow-up.  At this time patient is medically cleared for discharge and will be going to rehab facility for ongoing therapy.    DISCHARGE Follow Up Recommendations for labs and diagnostics:   BMP in 1 week  Repeat iron profile in 2-3 months  Needs screening colonoscopy    Day of Discharge     Vital Signs:  Temp:  [97.8 °F (36.6 °C)-98.6 °F (37 °C)] 98.1 °F (36.7 °C)  Heart Rate:  [100-115] 114  Resp:  [16-18] 16  BP: ()/(50-67) 94/57  Physical Exam:   GENERAL: The patient is thin, conversant and nontoxic.  HEENT: PERRLA, Oropharynx clear. Moist mucous membranes.   NECK: Supple, trachea midline  HEART: Regular rate and rhythm without murmurs.  LUNGS: Equal air entry, clear to auscultation bilaterally.  MSK: right AKA with bandage/Ace wrap in place. Muscle wasting in extremities  ABDOMEN: Soft, positive bowel sounds, nontender, nondistended  SKIN: No rash  NEUROLOGIC: Alert, cranial nerves grossly intact    Discharge Details        Discharge Medications      New Medications      Instructions Start Date   ferrous sulfate 325 (65 FE) MG tablet   325 mg, Oral, Daily With Breakfast      HYDROcodone-acetaminophen 5-325 MG per tablet  Commonly known as: NORCO   1 tablet, Oral, Every 6 Hours PRN      hydrOXYzine 25 MG tablet  Commonly known as: ATARAX   25 mg, Oral, 3 Times Daily PRN             No Known Allergies    Discharge Disposition:  Rehab Facility or Unit (DC - External)    Diet:  Hospital:  Diet Order   Procedures   • Diet Regular; Daily Fluid Restriction; 1500 mL Fluid Per Day       Discharge Activity:   Activity Instructions     Up WIth Assist            CODE STATUS:  Code Status and Medical Interventions:   Ordered at:  10/11/22 1933     Code Status (Patient has no pulse and is not breathing):    CPR (Attempt to Resuscitate)     Medical Interventions (Patient has pulse or is breathing):    Full Support         No future appointments.    Additional Instructions for the Follow-ups that You Need to Schedule     Discharge Follow-up with PCP   As directed       Currently Documented PCP:    Provider, No Known    PCP Phone Number:    None     Follow Up Details: establish care         Discharge Follow-up with Specified Provider: Dr Whittington; 1 Month   As directed      To: Dr Whittington    Follow Up: 1 Month               Pertinent  and/or Most Recent Results     PROCEDURES:   10/13/2022  Right above-the-knee amputation (Right: Thigh)    LAB RESULTS:      Lab 10/19/22  0324 10/18/22  0425 10/17/22  0519 10/16/22  0517 10/15/22  0537 10/14/22  0321   WBC 12.19* 9.21 8.18 9.97 12.54* 17.31*   HEMOGLOBIN 8.3* 8.1* 8.4* 8.6* 8.6* 7.7*   HEMATOCRIT 25.5* 24.0* 25.0* 25.2* 24.5* 23.0*   PLATELETS 639* 637* 612* 583* 609* 473*   NEUTROS ABS  --   --   --   --  9.24* 15.35*   IMMATURE GRANS (ABS)  --   --   --   --  0.17* 0.28*   LYMPHS ABS  --   --   --   --  1.78 1.09   MONOS ABS  --   --   --   --  1.31* 0.56   EOS ABS  --   --   --   --  0.03 0.01   MCV 92.4 89.9 89.0 89.4 88.4 89.5         Lab 10/20/22  0444 10/19/22  0324 10/18/22  0425 10/17/22  0519 10/16/22  0517 10/15/22  0537 10/14/22  1412 10/14/22  0321   SODIUM 130* 128* 126* 129* 127* 126* 123* 125*   POTASSIUM 3.6 4.5 4.2 3.7 3.9 3.8 3.9 3.5   CHLORIDE 95* 93* 94* 93* 90* 91* 91* 92*   CO2 24.8 26.0 24.0 24.3 27.2 23.8 23.8 24.6   ANION GAP 10.2 9.0 8.0 11.7 9.8 11.2 8.2 8.4   BUN 10 8 8 5* 5* 6* 8 8   CREATININE 0.39* 0.41* 0.40* 0.42* 0.40* 0.39* 0.30* 0.34*   EGFR 123.5 121.7 122.6 120.8 122.6 123.5 133.7 128.8   GLUCOSE 102* 109* 110* 110* 113* 114* 150* 146*   CALCIUM 9.0 9.1 9.0 9.2 9.1 8.2* 8.4* 8.1*   MAGNESIUM  --   --   --  1.8 1.9 1.8 2.1 1.7   PHOSPHORUS  --   --   --  3.7 4.0  2.3* 1.9* 3.1   TSH  --   --   --   --   --   --   --  2.910         Lab 10/19/22  0324 10/18/22  0425 10/17/22  0519 10/16/22  0517 10/15/22  0537   TOTAL PROTEIN 6.7 6.4 6.3 6.6 6.0   ALBUMIN 2.80* 2.50* 2.70* 2.60* 2.10*   GLOBULIN 3.9 3.9 3.6 4.0 3.9   ALT (SGPT) 63* 64* 67* 89* 110*   AST (SGOT) 37 43* 45* 75* 131*   BILIRUBIN 0.2 0.2 0.4 0.4 0.4   ALK PHOS 219* 215* 213* 236* 237*                 Lab 10/17/22  0519 10/13/22  1840   IRON 22*  --    IRON SATURATION 13*  --    TIBC 173*  --    TRANSFERRIN 116*  --    ABO TYPING  --  O   RH TYPING  --  Negative   ANTIBODY SCREEN  --  Negative         Brief Urine Lab Results     None        Microbiology Results (last 10 days)     Procedure Component Value - Date/Time    MRSA Screen, PCR (Inpatient) - Swab, Nares [[id number]]  (Normal) Collected: 10/11/22 2242    Lab Status: Final result Specimen: Swab from Nares Updated: 10/12/22 0207     MRSA PCR No MRSA Detected    Narrative:      The negative predictive value of this diagnostic test is high and should only be used to consider de-escalating anti-MRSA therapy. A positive result may indicate colonization with MRSA and must be correlated clinically.    Blood Culture - Blood, Arm, Left [[id number]]  (Normal) Collected: 10/11/22 1745    Lab Status: Final result Specimen: Blood from Arm, Left Updated: 10/16/22 1745     Blood Culture No growth at 5 days    Blood Culture - Blood, Arm, Left [[id number]]  (Normal) Collected: 10/11/22 1744    Lab Status: Final result Specimen: Blood from Arm, Left Updated: 10/16/22 1801     Blood Culture No growth at 5 days    Wound Culture - Wound, Leg, Right [[id number]]  (Abnormal) Collected: 10/11/22 1720    Lab Status: Final result Specimen: Wound from Leg, Right Updated: 10/14/22 0756     Wound Culture Heavy growth (4+) Mixed Gram Negative Michelle     Gram Stain Few (2+) Gram positive cocci in pairs and clusters      Few (2+) Gram negative bacilli          CT Angio Abdominal Aorta Bilateral  Iliofem Runoff    Result Date: 10/11/2022  Impression:   1. Extensive gas within the right foot and right lower leg compatible with cellulitis and infection with gas-forming organism. 2. 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  There is reconstitution of small popliteal artery on the left with 2 vessel runoff into the left foot.  There is also reconstitution of the small right popliteal artery with the anterior and posterior tibial arteries and peroneal artery occluded above the level of the ankle.   Findings personally discussed with Dr. Song of the emergency department at approximately 1915 hours on 10/11/2022    COSTA ROBBINS MD       Electronically Signed and Approved By: COSTA ROBBINS MD on 10/11/2022 at 20:30             US Liver    Result Date: 10/12/2022  Impression:   1. Liver is normal in size without focal lesion.  Contour is not clearly nodular. 2. The pancreas and spleen are obscured by bowel gas.  No other acute findings or change compared to CT study 10/11/2022.      HUA KRAUSE MD       Electronically Signed and Approved By: HUA KRAUSE MD on 10/12/2022 at 8:17             XR Chest 1 View    Result Date: 10/11/2022  Impression:   1. Linear opacities within the bilateral upper lobes which may be due to atelectasis or scarring.  No other acute cardiopulmonary disease identified.       COSTA ROBBINS MD       Electronically Signed and Approved By: COSTA ROBBINS MD on 10/11/2022 at 19:27               Labs Pending at Discharge: NONE    Time spent on Discharge including face to face service: >30 minutes    Electronically signed by Greg eLrma DO, 10/20/22, 7:57 AM EDT.

## 2022-10-20 NOTE — PLAN OF CARE
Goal Outcome Evaluation:                   Patient is alert and oriented x4.  Vital signs stable.  Patient medicated for pain per MAR x2 this shift.  Patient medicated for anxiety x1 this shift.  Wound care performed per order.  Patient to be discharged to [facility] for rehabilitation.  EMS to provide transportation.  Discharge instructions provided to patient.  Patient verbalized understanding of discharge instructions.  IV removed with tip intact.

## 2022-10-20 NOTE — PLAN OF CARE
Goal Outcome Evaluation:      Transfer from 5stu this shift. Patient alert and oriented. Dressing to right lower extremity and coccyx intact. Possible discharge to [facility] today.

## 2022-11-16 ENCOUNTER — OFFICE VISIT (OUTPATIENT)
Dept: VASCULAR SURGERY | Facility: HOSPITAL | Age: 62
End: 2022-11-16

## 2022-11-16 VITALS
OXYGEN SATURATION: 95 % | RESPIRATION RATE: 18 BRPM | SYSTOLIC BLOOD PRESSURE: 142 MMHG | TEMPERATURE: 97.8 F | DIASTOLIC BLOOD PRESSURE: 70 MMHG | HEART RATE: 108 BPM

## 2022-11-16 DIAGNOSIS — Z89.611 STATUS POST ABOVE-KNEE AMPUTATION OF RIGHT LOWER EXTREMITY: ICD-10-CM

## 2022-11-16 DIAGNOSIS — I73.9 PAD (PERIPHERAL ARTERY DISEASE): Primary | ICD-10-CM

## 2022-11-16 PROCEDURE — 99024 POSTOP FOLLOW-UP VISIT: CPT | Performed by: SURGERY

## 2022-11-16 PROCEDURE — G0463 HOSPITAL OUTPT CLINIC VISIT: HCPCS | Performed by: SURGERY

## 2022-11-16 RX ORDER — MULTIPLE VITAMINS W/ MINERALS TAB 9MG-400MCG
1 TAB ORAL DAILY
COMMUNITY

## 2022-11-16 NOTE — PROGRESS NOTES
[facility]   Follow up Office    Patient Name: Uche Pereyra  : 1960  MRN: [id number]  Primary Care Physician:  Provider, No Known      Subjective   Subjective     HPI:    Uche Pereyra is a 62 y.o. male here for follow-up after right AKA for gas gangrene 10/13/2022.  Doing well.  No new complaints.  No problems with his left foot at this time.      Objective     Vitals:   Temp:  [97.8 °F (36.6 °C)] 97.8 °F (36.6 °C)  Heart Rate:  [108] 108  Resp:  [18] 18  BP: (142)/(70) 142/70    Physical Exam      General: Alert, no acute distress  Right AKA: Staple line intact.  3 cm in diameter area of dry scab in the medial aspect.    Diagnostic studies: A CTA dated 10/12/2022 was once again reviewed.  There is extensive occlusive disease affecting the left lower extremity as well.  The external iliac is occluded, common femoral and superficial femoral arteries are occluded.  The popliteal artery is visualized.    Assessment & Plan   Assessment / Plan     Diagnoses and all orders for this visit:    1. PAD (peripheral artery disease) ([facility]) (Primary)    2. Status post above-knee amputation of right lower extremity ([facility])       Assessment/Plan:   Satisfactory progress following right AKA.  Area of dry scab does not appear to be immediately compromising of the integrity of the wound.  Staples out.  Follow-up in 3 weeks.  I have discussed with Mr. Pereyra regarding the severity of disease on the left side.  I advised him that we need to keep a very close eye and intervene if there is any issues, pain, wounds or any other immediate concern.        Electronically signed by Ghulam Whittington MD, 22, 10:02 AM EST.

## 2022-12-12 ENCOUNTER — OFFICE VISIT (OUTPATIENT)
Dept: VASCULAR SURGERY | Facility: HOSPITAL | Age: 62
End: 2022-12-12

## 2022-12-12 VITALS
TEMPERATURE: 97.8 F | SYSTOLIC BLOOD PRESSURE: 140 MMHG | OXYGEN SATURATION: 96 % | HEART RATE: 98 BPM | DIASTOLIC BLOOD PRESSURE: 80 MMHG | RESPIRATION RATE: 18 BRPM

## 2022-12-12 DIAGNOSIS — Z89.611 STATUS POST ABOVE-KNEE AMPUTATION OF RIGHT LOWER EXTREMITY: Primary | ICD-10-CM

## 2022-12-12 DIAGNOSIS — I73.9 PAD (PERIPHERAL ARTERY DISEASE): ICD-10-CM

## 2022-12-12 PROCEDURE — G0463 HOSPITAL OUTPT CLINIC VISIT: HCPCS | Performed by: SURGERY

## 2022-12-12 PROCEDURE — 99024 POSTOP FOLLOW-UP VISIT: CPT | Performed by: SURGERY

## 2022-12-12 NOTE — PROGRESS NOTES
[facility]   Follow up Office    Patient Name: Uche Pereyra  : 1960  MRN: [id number]  Primary Care Physician:  Provider, No Known      Subjective   Subjective     HPI:    Uche Pereyra is a 62 y.o. male here for follow-up after right above-knee amputation 10/13/2022.  Doing well.  No new complaints.      Objective     Vitals:   Temp:  [97.8 °F (36.6 °C)] 97.8 °F (36.6 °C)  Heart Rate:  [98] 98  Resp:  [18] 18  BP: (140)/(80) 140/80    Physical Exam      General: Alert, no acute distress  Right AKA: Dry eschar in the medial aspect of the distal right thigh, proximal wound.  The 1 remaining staple is still in place within the eschar.  No surrounding erythema.  The remainder of the wound appears to be healing well.    Assessment & Plan   Assessment / Plan     Diagnoses and all orders for this visit:    1. Status post above-knee amputation of right lower extremity (HCC) (Primary)    2. PAD (peripheral artery disease) (HCC)       Assessment/Plan:   Continue observation with painting of the eschar with Betadine to try to minimize infection.  Follow-up in 3 months with a Doppler of the left lower extremity, sooner as needed.        Electronically signed by Ghulam Whittington MD, 22, 10:10 AM EST.

## 2023-02-13 ENCOUNTER — TELEPHONE (OUTPATIENT)
Dept: VASCULAR SURGERY | Facility: HOSPITAL | Age: 63
End: 2023-02-13
Payer: MEDICAID

## 2023-03-13 ENCOUNTER — HOSPITAL ENCOUNTER (OUTPATIENT)
Dept: CARDIOLOGY | Facility: HOSPITAL | Age: 63
Discharge: HOME | End: 2023-03-13
Admitting: SURGERY
Payer: MEDICAID

## 2023-03-13 DIAGNOSIS — I73.9 PAD (PERIPHERAL ARTERY DISEASE): ICD-10-CM

## 2023-03-13 DIAGNOSIS — Z89.611 STATUS POST ABOVE-KNEE AMPUTATION OF RIGHT LOWER EXTREMITY: ICD-10-CM

## 2023-03-13 LAB
BH CV LOWER ARTERIAL LEFT ABI RATIO: 0.34
BH CV LOWER ARTERIAL LEFT DORSALIS PEDIS SYS MAX: 49
BH CV LOWER ARTERIAL LEFT GREAT TOE SYS MAX: 0
BH CV LOWER ARTERIAL LEFT LOW THIGH SYS MAX: 59
BH CV LOWER ARTERIAL LEFT POPLITEAL SYS MAX: 54
BH CV LOWER ARTERIAL LEFT POST TIBIAL SYS MAX: 48
BH CV LOWER ARTERIAL LEFT TBI RATIO: 0
MAXIMAL PREDICTED HEART RATE: 157 BPM
STRESS TARGET HR: 133 BPM
UPPER ARTERIAL LEFT ARM BRACHIAL SYS MAX: 146 MMHG
UPPER ARTERIAL RIGHT ARM BRACHIAL SYS MAX: 145 MMHG

## 2023-03-13 PROCEDURE — 93923 UPR/LXTR ART STDY 3+ LVLS: CPT

## 2023-03-13 PROCEDURE — 93922 UPR/L XTREMITY ART 2 LEVELS: CPT | Performed by: SURGERY

## 2023-03-22 ENCOUNTER — OFFICE VISIT (OUTPATIENT)
Dept: VASCULAR SURGERY | Facility: HOSPITAL | Age: 63
End: 2023-03-22
Payer: MEDICAID

## 2023-03-22 VITALS
RESPIRATION RATE: 16 BRPM | SYSTOLIC BLOOD PRESSURE: 148 MMHG | OXYGEN SATURATION: 95 % | TEMPERATURE: 98.1 F | DIASTOLIC BLOOD PRESSURE: 80 MMHG | HEART RATE: 100 BPM

## 2023-03-22 DIAGNOSIS — Z89.611 STATUS POST ABOVE-KNEE AMPUTATION OF RIGHT LOWER EXTREMITY: Primary | ICD-10-CM

## 2023-03-22 DIAGNOSIS — I73.9 PAD (PERIPHERAL ARTERY DISEASE): ICD-10-CM

## 2023-03-22 PROCEDURE — 1160F RVW MEDS BY RX/DR IN RCRD: CPT | Performed by: SURGERY

## 2023-03-22 PROCEDURE — 1159F MED LIST DOCD IN RCRD: CPT | Performed by: SURGERY

## 2023-03-22 PROCEDURE — 99212 OFFICE O/P EST SF 10 MIN: CPT | Performed by: SURGERY

## 2023-03-22 PROCEDURE — G0463 HOSPITAL OUTPT CLINIC VISIT: HCPCS | Performed by: SURGERY

## 2023-03-22 NOTE — PROGRESS NOTES
[facility]   Follow up Office    Patient Name: Uche Pereyra  : 1960  MRN: [id number]  Primary Care Physician:  Provider, No Known      Subjective   Subjective     HPI:    Uche Pereyra is a 62 y.o. male here for follow-up after right above-knee amputation.  Doing well otherwise.  The dry scab in the medial portion of the wound fell off.  He has not been able to straighten the left knee for about 6 weeks now.      Objective     Vitals:   Temp:  [98.1 °F (36.7 °C)] 98.1 °F (36.7 °C)  Heart Rate:  [100] 100  Resp:  [16] 16  BP: (148)/(80) 148/80    Physical Exam      General: Alert, no acute distress  Right AKA wound: Healing well.  2 small residual areas of minimal dry scab.    Diagnostic studies: An arterial Doppler in the office today demonstrates a severely reduced left KENDALL at 0.34.  The great toe pressure was not measurable.    Assessment & Plan   Assessment / Plan     Diagnoses and all orders for this visit:    1. Status post above-knee amputation of right lower extremity (HCC) (Primary)  -     Ambulatory Referral to Physical Therapy Evaluate and treat; Stretching, ROM    2. PAD (peripheral artery disease) ([facility])  -     Ambulatory Referral to Physical Therapy Evaluate and treat; Stretching, ROM       Assessment/Plan:   Mr. Pereyra is doing well from the right AKA standpoint.  We discussed about a prosthesis however with his inability at this time to straighten his left knee that would not be an option.  The plan will be for him to start physical therapy in hopes that he can regain range of motion of his left knee.  Follow-up with me in 3 months.  We discussed regarding his severe disease in the left leg.  Given the extent of disease and what would be required to revascularize him at this time the best option is to protect his left foot.  He will require an extensive surgical revascularization.  I advised him that if it any point he develops any left foot wounds he needs to come see me as soon  as possible.        Electronically signed by Ghulam Whittington MD, 03/22/23, 11:07 AM EDT.

## 2023-04-03 ENCOUNTER — TELEPHONE (OUTPATIENT)
Dept: VASCULAR SURGERY | Facility: HOSPITAL | Age: 63
End: 2023-04-03
Payer: MEDICAID

## 2023-08-28 ENCOUNTER — OFFICE VISIT (OUTPATIENT)
Dept: VASCULAR SURGERY | Facility: HOSPITAL | Age: 63
End: 2023-08-28
Payer: MEDICAID

## 2023-08-28 VITALS
SYSTOLIC BLOOD PRESSURE: 132 MMHG | TEMPERATURE: 98.1 F | RESPIRATION RATE: 14 BRPM | DIASTOLIC BLOOD PRESSURE: 80 MMHG | HEART RATE: 80 BPM | OXYGEN SATURATION: 96 %

## 2023-08-28 DIAGNOSIS — Z89.611 STATUS POST ABOVE-KNEE AMPUTATION OF RIGHT LOWER EXTREMITY: Primary | ICD-10-CM

## 2023-08-28 PROCEDURE — G0463 HOSPITAL OUTPT CLINIC VISIT: HCPCS | Performed by: SURGERY

## 2023-08-28 NOTE — PROGRESS NOTES
[facility]   Follow up Office    Patient Name: Uche Pereyra  : 1960  MRN: [id number]  Primary Care Physician:  Provider, No Known      Subjective   Subjective     HPI:    Uche Pereyra is a 62 y.o. male status post right above-knee amputation 10/13/2022.  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  He has now completed physical therapy and able to stand with minimal angulation of his left knee.  Doing well otherwise.  Here to discuss about a prosthesis.      Objective     Vitals:   Temp:  [98.1 øF (36.7 øC)] 98.1 øF (36.7 øC)  Heart Rate:  [80] 80  Resp:  [14] 14  BP: (132)/(80) 132/80    Physical Exam      General: Alert, no acute distress.  HEENT: PERRLA  Abdomen: Benign  Extremities: Status post right above-knee amputation, well-healed.  Able to stand with minimal angulation of the left knee.  Neuro: No gross deficits    Assessment & Plan   Assessment / Plan     Diagnoses and all orders for this visit:    1. Status post above-knee amputation of right lower extremity (Primary)       Assessment/Plan:   Satisfactory progress following right above-knee amputation and physical therapy to resolve contracture of the left knee.  Referral for right above-knee prosthesis.  Follow-up in 6 months for recheck.  Mr. Pereyra is in need for a prosthetis for a right above-knee amputation and at this time he is healed and okay to begin using a prosthetic.  He is a current right above-the-knee amputee patient.  He is very excited to start the process for a prosthesis for a better functioning prosthesis.  He has strength and range of motion to operate a prosthesis.  He does not have any comorbidities that would prevent prosthetic ambulation.  He is current level of ambulation is K3, and he is expected to maintain K3 level ambulation with delivery of right above-knee prosthesis.  The patient needs a prosthesis to ambulate efficiently for everyday  life.        Electronically signed by Ghulam Whittington MD, 08/28/23, 10:53 AM EDT.

## 2023-10-18 ENCOUNTER — TELEPHONE (OUTPATIENT)
Dept: VASCULAR SURGERY | Facility: HOSPITAL | Age: 63
End: 2023-10-18
Payer: MEDICAID

## 2023-10-18 DIAGNOSIS — Z89.611 STATUS POST ABOVE-KNEE AMPUTATION OF RIGHT LOWER EXTREMITY: Primary | ICD-10-CM

## 2023-10-18 NOTE — TELEPHONE ENCOUNTER
Caller: [facility]    Relationship: PROVIDER    Best call back number: [phone]     What is the medical concern/diagnosis: RIGHT ABOVE THE KNEE AMPUTATION     What specialty or service is being requested: PHYSICAL THERAPY     What is the provider, practice or medical service name: [facility] [place]    What is the office location:  [address] [place] [address]    What is the office phone number: PHONE [phone] FAX:[phone]    Any additional details: [facility] CALLED [facility] STATING THAT THEY WOULD NEED AN NEW REFERRAL FOR INSURANCE PURPOSES.

## 2024-02-28 ENCOUNTER — OFFICE VISIT (OUTPATIENT)
Dept: VASCULAR SURGERY | Facility: HOSPITAL | Age: 64
End: 2024-02-28
Payer: MEDICAID

## 2024-02-28 VITALS
RESPIRATION RATE: 14 BRPM | HEART RATE: 80 BPM | TEMPERATURE: 98.3 F | DIASTOLIC BLOOD PRESSURE: 74 MMHG | SYSTOLIC BLOOD PRESSURE: 132 MMHG | OXYGEN SATURATION: 98 %

## 2024-02-28 DIAGNOSIS — I73.9 PAD (PERIPHERAL ARTERY DISEASE): ICD-10-CM

## 2024-02-28 DIAGNOSIS — Z89.611 STATUS POST ABOVE-KNEE AMPUTATION OF RIGHT LOWER EXTREMITY: Primary | ICD-10-CM

## 2024-02-28 PROCEDURE — 1159F MED LIST DOCD IN RCRD: CPT | Performed by: NURSE PRACTITIONER

## 2024-02-28 PROCEDURE — 99212 OFFICE O/P EST SF 10 MIN: CPT | Performed by: NURSE PRACTITIONER

## 2024-02-28 PROCEDURE — G0463 HOSPITAL OUTPT CLINIC VISIT: HCPCS | Performed by: NURSE PRACTITIONER

## 2024-02-28 PROCEDURE — 1160F RVW MEDS BY RX/DR IN RCRD: CPT | Performed by: NURSE PRACTITIONER

## 2024-02-28 NOTE — PROGRESS NOTES
[facility]     Progress Note    Patient Name: Uche Pereyra  : 1960  MRN: [id number]  Primary Care Physician:  Provider, No Known  Date of admission: (Not on file)  Chief Complaint:    Chief Complaint   Patient presents with    Follow-up     Patient is here as a six month follow up with no testing. Patient is s/p right AKA.        Subjective   Subjective     Mr. Pereyra presents for 6-month follow-up, he had a right above-the-knee amputation in 2022.  He had developed a contracture of the left knee preventing him from ambulating with the prosthetic however completed physical therapy and has since received his prosthesis and is doing well.  No complaints at this time.    Objective   Objective     Vitals:   Temp:  [98.3 °F (36.8 °C)] 98.3 °F (36.8 °C)  Heart Rate:  [80] 80  Resp:  [14] 14  BP: (132)/(74) 132/74    Physical Exam   General: Alert, no acute distress  Extremities: Well-healed right AKA  Neuro: No gross deficits    Result Review    Result Review:  I have personally reviewed the results from the time of this admission to 2024 10:45 EST and agree with these findings:  []  Laboratory  []  Microbiology  []  Radiology  []  EKG/Telemetry   []  Cardiology/Vascular   []  Pathology  []  Old records  []  Other:    Most notable findings include:     Assessment & Plan   Assessment / Plan     Assessment/Plan:  Diagnoses and all orders for this visit:    1. Status post above-knee amputation of right lower extremity (Primary)    2. PAD (peripheral artery disease)    Mr. 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  He has received his prosthetic and completed physical therapy.  No further vascular interventions recommended at this time, he may follow-up as needed.    I have answered all of their questions and they are in agreement with the plan at this time.  Thank you for allowing me to  participate in your patient's care.    Active Hospital Problems:  There are no active hospital problems to display for this patient.          Electronically signed by CHERYLE Fishman, 02/28/24, 10:38 AM EST.

## 2024-06-08 NOTE — TELEPHONE ENCOUNTER
L/M FOR PT TO CALL AND R/S 3/13/23 APPT, PLEASE TRANSFER CALL DIRECTLY TO OFFICE.   Pt to ED via triage with c/o \"clogged ears\". Pt AxO 4/4, RR regular and unlabored, ambulatory with steady gait, no distress on arrival.

## (undated) DEVICE — STERILE POLYISOPRENE POWDER-FREE SURGICAL GLOVES: Brand: PROTEXIS

## (undated) DEVICE — SUT SILK 2/0 SH 30IN K833H

## (undated) DEVICE — APPL CHLORAPREP HI/LITE 26ML ORNG

## (undated) DEVICE — STRYKER PERFORMANCE SERIES SAGITTAL BLADE: Brand: STRYKER PERFORMANCE SERIES

## (undated) DEVICE — SUT SILK 2/0 SH CR8 18IN CR8 C012D

## (undated) DEVICE — EXTREMITY-LF: Brand: MEDLINE INDUSTRIES, INC.

## (undated) DEVICE — BASIC SINGLE BASIN-LF: Brand: MEDLINE INDUSTRIES, INC.

## (undated) DEVICE — BLANKT WARM PACU MISTRAL/AIR PREM REFL A/ 85.8X50IN

## (undated) DEVICE — GLV SURG SENSICARE PI ORTHO SZ8 LF STRL

## (undated) DEVICE — SUT SILK 3/0 SH CR8 18IN C013D

## (undated) DEVICE — TOWEL,OR,DSP,ST,BLUE,STD,4/PK,20PK/CS: Brand: MEDLINE

## (undated) DEVICE — SPNG LAP PREWSH SFTPK 18X18IN STRL PK/5

## (undated) DEVICE — ELECTRD BLD EDGE COAT 3IN

## (undated) DEVICE — PENCL E/S SMOKEEVAC W/TELESCP CANN

## (undated) DEVICE — INTENDED FOR TISSUE SEPARATION, AND OTHER PROCEDURES THAT REQUIRE A SHARP SURGICAL BLADE TO PUNCTURE OR CUT.: Brand: BARD-PARKER ® CARBON RIB-BACK BLADES

## (undated) DEVICE — SPK PIN NSR CHEMO MINI PLS LL LF

## (undated) DEVICE — DRSNG SURG AQUACEL AG 9X30CM

## (undated) DEVICE — SUT VIC 3/0 SH 27IN J416H

## (undated) DEVICE — PROXIMATE RH ROTATING HEAD SKIN STAPLERS (35 WIDE) CONTAINS 35 STAINLESS STEEL STAPLES: Brand: PROXIMATE

## (undated) DEVICE — PISTOL GRIP SKIN STAPLER: Brand: MULTIFIRE PREMIUM

## (undated) DEVICE — ANTIBACTERIAL UNDYED BRAIDED (POLYGLACTIN 910), SYNTHETIC ABSORBABLE SUTURE: Brand: COATED VICRYL

## (undated) DEVICE — SUT SILK 2/0 TIES 18IN A185H